# Patient Record
Sex: FEMALE | Race: WHITE | NOT HISPANIC OR LATINO | Employment: FULL TIME | URBAN - METROPOLITAN AREA
[De-identification: names, ages, dates, MRNs, and addresses within clinical notes are randomized per-mention and may not be internally consistent; named-entity substitution may affect disease eponyms.]

---

## 2017-01-25 ENCOUNTER — HOSPITAL ENCOUNTER (EMERGENCY)
Facility: CLINIC | Age: 22
Discharge: HOME | End: 2017-01-25
Attending: EMERGENCY MEDICINE | Admitting: EMERGENCY MEDICINE
Payer: COMMERCIAL

## 2017-01-25 VITALS
TEMPERATURE: 97.8 F | OXYGEN SATURATION: 100 % | DIASTOLIC BLOOD PRESSURE: 86 MMHG | HEIGHT: 64 IN | SYSTOLIC BLOOD PRESSURE: 136 MMHG | BODY MASS INDEX: 19.63 KG/M2 | RESPIRATION RATE: 18 BRPM | WEIGHT: 115 LBS

## 2017-01-25 DIAGNOSIS — S61.213A LACERATION OF LEFT MIDDLE FINGER: ICD-10-CM

## 2017-01-25 PROCEDURE — 12001 RPR S/N/AX/GEN/TRNK 2.5CM/<: CPT

## 2017-01-25 PROCEDURE — 99283 EMERGENCY DEPT VISIT LOW MDM: CPT

## 2017-01-25 RX ORDER — GINSENG 100 MG
CAPSULE ORAL
Status: DISCONTINUED
Start: 2017-01-25 | End: 2017-01-25 | Stop reason: HOSPADM

## 2017-01-25 RX ORDER — BUPIVACAINE HYDROCHLORIDE AND EPINEPHRINE 5; 5 MG/ML; UG/ML
INJECTION, SOLUTION PERINEURAL
Status: DISCONTINUED
Start: 2017-01-25 | End: 2017-01-25 | Stop reason: HOSPADM

## 2017-01-25 ASSESSMENT — ENCOUNTER SYMPTOMS
WOUND: 1
CARDIOVASCULAR NEGATIVE: 1
RESPIRATORY NEGATIVE: 1
GASTROINTESTINAL NEGATIVE: 1
MUSCULOSKELETAL NEGATIVE: 1

## 2017-01-25 NOTE — ED PROVIDER NOTES
Emergency Department Attending Supervision Note  1/25/2017  4:26 PM      I evaluated this patient in conjunction with Carli Smith NP.      Briefly, the patient presented with a laceration. Patient was cutting vegetables, when she slipped and sustained a laceration to her left middle finger. She denies numbness/tingling/weakness anywhere or other injuries.     Physical Exam   Constitutional: The patient is oriented to person, place, and time. Alert and cooperative.   HENT:   Right Ear: External ear normal.   Left Ear: External ear normal.   Nose: Nose normal.    Eyes: Conjunctivae, EOM and lids are normal.   Cardiovascular: RRR, no murmurs, rubs, or gallops  Pulmonary/Chest: Effort normal.   Musculoskeletal: Normal range of motion.  Neurological: Normal strength. No cranial nerve deficit or sensory deficit.   Skin: No rash noted. Laceration to the distal, palmar aspect of the left long finger. No significant bleeding. Distal sensation, vascular, tendon, and motor function intact.   Psychiatric:The patient has a normal mood and affect.       My impression is a laceration. No signs to suggest foreign body or bony injury. Distally sensation, vascular, motor, and tendon function intact. No other injuries. Laceration was repaired. Please refer to separate procedure note for further details. Return instructions given. Removal of sutures in 7 days. She was stable/improved at the time of discharge.         Diagnosis    ICD-10-CM    1. Laceration of left middle finger S61.213A          Lucrecia Meyer MD  01/26/17 1651    Lucrecia Wu MD  01/26/17 1652

## 2017-01-25 NOTE — ED PROVIDER NOTES
History     Chief Complaint:  Laceration      HPI   Yennifer Haines is a 21 year old female who presents with laceration to her left middle finger tip.  At 1500 today, pt was cutting vegetables with a kitchen knife and accidentally cut her finger.  Bleeding was controlled with pressure and a dressing.  Pt denies any numbness or loss of sensation.  Her last tetanus shot was in 2016.  Pt otherwise well, no other complaints.      Allergies:  No Known Allergies     Medications:      ISOtretinoin (ACCUTANE PO)   desogestrel-ethinyl estradiol (APRI) 0.15-30 MG-MCG per tablet   sertraline (ZOLOFT) 100 MG tablet   UNKNOWN TO PATIENT   ibuprofen (IBU) 600 MG tablet       Problem List:    Patient Active Problem List    Diagnosis Date Noted     Adjustment disorder with mixed anxiety and depressed mood 04/16/2015     Heroin abuse      Treatment 2015          Past Medical History:    Past Medical History   Diagnosis Date     Heroin abuse      Depressive disorder        Past Surgical History:    Past Surgical History   Procedure Laterality Date     Orthopedic surgery Bilateral      Bunions     Orthopedic surgery Bilateral childhood     arm surgery 1 on left and 4x right       Family History:    Family History   Problem Relation Age of Onset     GASTROINTESTINAL DISEASE Sister      UC     Breast Cancer Maternal Grandmother      Neurologic Disorder Maternal Grandmother      Erwinna's      DIABETES Maternal Grandfather        Social History:  Marital Status:  Single [1]  Social History   Substance Use Topics     Smoking status: Never Smoker      Smokeless tobacco: Never Used     Alcohol Use: No      Comment: Previously rarely        Review of Systems   Respiratory: Negative.    Cardiovascular: Negative.    Gastrointestinal: Negative.    Musculoskeletal: Negative.    Skin: Positive for wound.        Laceration to left middle finger     Physical Exam   First Vitals:  BP: 136/86 mmHg  Heart Rate: 91  Temp: 97.8  F (36.6  " C)  Resp: 18  Height: 162.6 cm (5' 4\")  Weight: 52.164 kg (115 lb)  SpO2: 100 %    Physical Exam  General:  Alert, no obvious discomfort, well kept.  Eyes:  PERRL, conjunctivae pink, no scleral icterus or conjunctival injection.  ENT:  Moist mucus membranes, normal voice.  Resp:  Lungs clear to auscultation bilaterally, no rales/rhonchi/wheezes. Good air movement to bases, normal respiratory effort.  CV:  Normal rate and rhythm, no murmurs/rubs/gallops.  Skin:  Warm, dry.  No rashes or petechiae.  Laceration to left middle finger tip.  Full ROM of all fingers.   Musculoskeletal: No peripheral edema or calf tenderness, normal gross ROM.   Neuro: Alert and oriented to person/place/time, normal sensation.  Normal sensation of left middle finger.  Responds appropriately to questions and commands.  Normal muscle tone.  Psychiatric: Normal mood and affect, cooperative, good eye contact.    Emergency Department Course   Procedures:     Laceration Repair      LACERATION:  A simple clean 1.5 x 0.5 cm laceration.    LOCATION:  Left middle finger, distal phalanx.    FUNCTION:  Distally sensation, circulation, motor and tendon function are intact.    ANESTHESIA:  Local using 0.5mls total of  0.5% bupivicaine with epi.    PREPARATION:  Irrigation with Normal Saline.    DEBRIDEMENT:  no debridement.    CLOSURE:  Wound was closed with One Layer.  Skin closed with 5 x 4.0 Ethylon using interrupted sutures..         Emergency Department Course:  Nursing notes and vitals reviewed.  I performed an exam of the patient as documented above. GCS 15.   Finger numbed with bupivicaine as noted above.  1620- Dr. Wu in to see pt.    Tech in to clean wound.  1640-laceration repair as noted above.  Pt tolerated well.  Discussed plan of care and discharge with patient and her family.  Keep area clean and dry, ok to apply Bacitracin.  Watch for signs and symptoms of infection including redness, swelling, drainage, fever.  Follow up with PCP " in 7 days for suture removal.  Pt states understanding, her questions were answered.    Tech in to apply bandage and finger splint.     Impression & Plan    Medical Decision Making:  Yennifer Haines is a 21 year old female who presents with laceration to her left middle finger distal phalanx.  Pt was cutting vegetables with kitchen knife when she accidentally cut herself.  Hemostasis achieved with pressure.  On exam, sensation and motor movements intact.  No evidence of tendon injury.  Laceration repaired as noted above.  Pt tolerated well.  Dressing applied.  Discussed proper aftercare with patient and to watch for signs and symptoms of infection.  Pt to follow up with her PCP in 7 days for suture removal.  Return to ER as needed for new or worsening symptoms.      Diagnosis:    ICD-10-CM    1. Laceration of left middle finger S61.213A        Disposition:  discharged to home with family.    Discharge Medications:  New Prescriptions    No medications on file     I, TRINI Rust, interviewed the patient, explained the course of action and discussed the patient with Dr. Wu who then evaluated the patient.      Carli Smith  1/25/2017   [facility] EMERGENCY DEPARTMENT        Carli Smith NP  01/25/17 1801

## 2017-01-25 NOTE — ED NOTES
A&Ox4. ABC's intact. Pt c/o left middle finger laceration. Pt was cutting food at home and cut off the tip of her finger.  Pain 8/10 at this time.  Bleeding controlled with drsg.     Last tetanus 2016

## 2017-01-25 NOTE — ED AVS SNAPSHOT
[facility] Emergency Department    [address]    [facility] [address]    Phone:  [phone]    Fax:  [phone]                                       Yennifer Haines   MRN: [id number]    Department:  [facility] Emergency Department   Date of Visit:  1/25/2017           Patient Information     Date Of Birth          1995        Your diagnoses for this visit were:     Laceration of left middle finger        You were seen by Lucrecia Wu MD.      Follow-up Information     Follow up with Pillo Ray MD In 1 week.    Specialty:  Family Practice    Contact information:    [facility]  [address]  [place] [address]  [phone]          Follow up with [facility] Emergency Department.    Specialty:  EMERGENCY MEDICINE    Why:  As needed, If symptoms worsen    Contact information:    [address]  [facility] [address]  [phone]        Discharge Instructions       Discharge Instructions  Laceration (Cut)    You were seen today for a laceration (cut).  Your doctor examined your laceration for any problems such a buried foreign body (like glass, a splinter, or gravel), or injury to blood vessels, tendons, and nerves.  Your doctor may have also rinsed and/or scrubbed your laceration to help prevent an infection.  Your laceration may have been closed with glue, staples or sutures (stitches).      It may not be possible to find all problems with your laceration on the first visit, and we can't always prevent infections.  Antibiotics are only given when the benefit is more than the risk, and don't prevent all infections. Some lacerations are too high risk to close, and are left open to heal.  All lacerations, no matter how expertly repaired, will cause scarring.    Return to the Emergency Department right away if:    You have more redness, swelling, pain, drainage (pus), a bad smell, or red streaking from your laceration.       You have a fever of 101oF or more.    You have bleeding that you can t stop at home. If your cut starts to bleed, hold pressure on the bleeding area with a clean cloth or put pressure over the bandage.  If the bleeding doesn t stop after using constant pressure for 30 minutes, you should return to the Emergency Department for further treatment.    An area past the laceration is cool, pale, or blue compared with the other side, or has a slower return of color when squeezed.    Your dressing seems too tight or starts to get uncomfortable or painful.    You have loss of normal function or use of an area, such as being unable to straighten or bend a finger normally.    You have a numb area past the laceration.    Return to the Emergency Department or see your regular doctor if:    The laceration starts to come open.     You have something coming out of the cut or a feeling that there is something in the laceration.    Your wound will not heal, or keeps breaking open. There can always be glass, wood, dirt or other things in any wound.  They won t always show up, even on x-rays.  If a wound doesn t heal, this may be why, and it is important to follow-up with your regular doctor.    Home Care:    Take your dressing off in 12 hours, or as instructed by your doctor, to check your laceration. Remove the dressing sooner if it seems too tight or painful, or if it is getting numb, tingly, or pale past the dressing.    Gently wash your laceration 2 times a day with clean cloth and soap.     It is okay to shower, but do not let the laceration soak in water.      If your laceration was closed with wound adhesive or strips: pat it dry and leave it open to the air.     For all other repairs: after you wash your laceration, or at least 2 times a day, apply bacitracin or other antibiotic ointment to the laceration, then cover it with a Band-Aid  or gauze.    Keep the laceration clean. Wear gloves or other protective clothing if  "you are around dirt.    Follow-up:    You need to follow-up with your regular doctor in 7 days.    Your sutures or staples need to be removed in 7 days. Schedule an appointment with your regular doctor to have this done.    Scars:  To help minimize scarring:    Wear sunscreen over the healed laceration when out in the sun.    Massage the area regularly.    You may use Vitamin E oil.    Wait a year.  Most scars will start to fade within a year.    Probiotics: If you have been given an antibiotic, you may want to also take a probiotic pill or eat yogurt with live cultures. Probiotics have \"good bacteria\" to help your intestines stay healthy. Studies have shown that probiotics help prevent diarrhea and other intestine problems (including C. diff infection) when you take antibiotics. You can buy these without a prescription in the pharmacy section of the store.     If you were given a prescription for medicine here today, be sure to read all of the information (including the package insert) that comes with your prescription.  This will include important information about the medicine, its side effects, and any warnings that you need to know about.  The pharmacist who fills the prescription can provide more information and answer questions you may have about the medicine.  If you have questions or concerns that the pharmacist cannot address, please call or return to the Emergency Department.     Remember that you can always come back to the Emergency Department if you are not able to see your regular doctor in the amount of time listed above, if you get any new symptoms, or if there is anything that worries you.        24 Hour Appointment Hotline       To make an appointment at any [facility], call [phone] ([phone]). If you don't have a family doctor or clinic, we will help you find one. [facility] are conveniently located to serve the needs of you and your family.             Review of your " medicines      Our records show that you are taking the medicines listed below. If these are incorrect, please call your family doctor or clinic.        Dose / Directions Last dose taken    ACCUTANE PO        Refills:  0        desogestrel-ethinyl estradiol 0.15-30 MG-MCG per tablet   Commonly known as:  APRI   Dose:  1 tablet        Take 1 tablet by mouth daily   Refills:  0        ibuprofen 600 MG tablet   Commonly known as:  IBU   Dose:  600 mg   Quantity:  60 tablet        Take 1 tablet by mouth every 6 hours as needed for pain.   Refills:  0        sertraline 100 MG tablet   Commonly known as:  ZOLOFT   Dose:  100 mg   Quantity:  7 tablet        Take 1 tablet (100 mg) by mouth daily   Refills:  0        UNKNOWN TO PATIENT   Indication:  Birth Control        Refills:  0                Orders Needing Specimen Collection     None      Pending Results     No orders found from 1/24/2017 to 1/26/2017.            Pending Culture Results     No orders found from 1/24/2017 to 1/26/2017.             Test Results from your hospital stay            Clinical Quality Measure: Blood Pressure Screening     Your blood pressure was checked while you were in the emergency department today. The last reading we obtained was  BP: 136/86 mmHg . Please read the guidelines below about what these numbers mean and what you should do about them.  If your systolic blood pressure (the top number) is less than 120 and your diastolic blood pressure (the bottom number) is less than 80, then your blood pressure is normal. There is nothing more that you need to do about it.  If your systolic blood pressure (the top number) is 120-139 or your diastolic blood pressure (the bottom number) is 80-89, your blood pressure may be higher than it should be. You should have your blood pressure rechecked within a year by a primary care provider.  If your systolic blood pressure (the top number) is 140 or greater or your diastolic blood pressure (the bottom  "number) is 90 or greater, you may have high blood pressure. High blood pressure is treatable, but if left untreated over time it can put you at risk for heart attack, stroke, or kidney failure. You should have your blood pressure rechecked by a primary care provider within the next 4 weeks.  If your provider in the emergency department today gave you specific instructions to follow-up with your doctor or provider even sooner than that, you should follow that instruction and not wait for up to 4 weeks for your follow-up visit.        Thank you for choosing [facility]       Thank you for choosing [facility] for your care. Our goal is always to provide you with excellent care. Hearing back from our patients is one way we can continue to improve our services. Please take a few minutes to complete the written survey that you may receive in the mail after you visit with us. Thank you!        [facility] Information     [facility] lets you send messages to your doctor, view your test results, renew your prescriptions, schedule appointments and more. To sign up, go to [email or web address] . Click on \"Log in\" on the left side of the screen, which will take you to the Welcome page. Then click on \"Sign up Now\" on the right side of the page.     You will be asked to enter the access code listed below, as well as some personal information. Please follow the directions to create your username and password.     Your access code is: [email or web address]  Expires: 2017  6:04 PM     Your access code will  in 90 days. If you need help or a new code, please call your [facility] clinic or [phone].        Care EveryWhere ID     This is your Care EveryWhere ID. This could be used by other organizations to access your [facility] medical records  [id number]        After Visit Summary       This is your record. Keep this with you and show to your community pharmacist(s) and doctor(s) at your next visit.                  "

## 2017-01-25 NOTE — ED AVS SNAPSHOT
[facility] Emergency Department    [address]    [facility] [address]    Phone:  [phone]    Fax:  [phone]                                       Yennifer Haines   MRN: [id number]    Department:  [facility] Emergency Department   Date of Visit:  1/25/2017           After Visit Summary Signature Page     I have received my discharge instructions, and my questions have been answered. I have discussed any challenges I see with this plan with the nurse or doctor.    ..........................................................................................................................................  Patient/Patient Representative Signature      ..........................................................................................................................................  Patient Representative Print Name and Relationship to Patient    ..................................................               ................................................  Date                                            Time    ..........................................................................................................................................  Reviewed by Signature/Title    ...................................................              ..............................................  Date                                                            Time

## 2017-01-26 NOTE — DISCHARGE INSTRUCTIONS
Discharge Instructions  Laceration (Cut)    You were seen today for a laceration (cut).  Your doctor examined your laceration for any problems such a buried foreign body (like glass, a splinter, or gravel), or injury to blood vessels, tendons, and nerves.  Your doctor may have also rinsed and/or scrubbed your laceration to help prevent an infection.  Your laceration may have been closed with glue, staples or sutures (stitches).      It may not be possible to find all problems with your laceration on the first visit, and we can't always prevent infections.  Antibiotics are only given when the benefit is more than the risk, and don't prevent all infections. Some lacerations are too high risk to close, and are left open to heal.  All lacerations, no matter how expertly repaired, will cause scarring.    Return to the Emergency Department right away if:    You have more redness, swelling, pain, drainage (pus), a bad smell, or red streaking from your laceration.      You have a fever of 101oF or more.    You have bleeding that you can t stop at home. If your cut starts to bleed, hold pressure on the bleeding area with a clean cloth or put pressure over the bandage.  If the bleeding doesn t stop after using constant pressure for 30 minutes, you should return to the Emergency Department for further treatment.    An area past the laceration is cool, pale, or blue compared with the other side, or has a slower return of color when squeezed.    Your dressing seems too tight or starts to get uncomfortable or painful.    You have loss of normal function or use of an area, such as being unable to straighten or bend a finger normally.    You have a numb area past the laceration.    Return to the Emergency Department or see your regular doctor if:    The laceration starts to come open.     You have something coming out of the cut or a feeling that there is something in the laceration.    Your wound will not heal, or keeps breaking  "open. There can always be glass, wood, dirt or other things in any wound.  They won t always show up, even on x-rays.  If a wound doesn t heal, this may be why, and it is important to follow-up with your regular doctor.    Home Care:    Take your dressing off in 12 hours, or as instructed by your doctor, to check your laceration. Remove the dressing sooner if it seems too tight or painful, or if it is getting numb, tingly, or pale past the dressing.    Gently wash your laceration 2 times a day with clean cloth and soap.     It is okay to shower, but do not let the laceration soak in water.      If your laceration was closed with wound adhesive or strips: pat it dry and leave it open to the air.     For all other repairs: after you wash your laceration, or at least 2 times a day, apply bacitracin or other antibiotic ointment to the laceration, then cover it with a Band-Aid  or gauze.    Keep the laceration clean. Wear gloves or other protective clothing if you are around dirt.    Follow-up:    You need to follow-up with your regular doctor in 7 days.    Your sutures or staples need to be removed in 7 days. Schedule an appointment with your regular doctor to have this done.    Scars:  To help minimize scarring:    Wear sunscreen over the healed laceration when out in the sun.    Massage the area regularly.    You may use Vitamin E oil.    Wait a year.  Most scars will start to fade within a year.    Probiotics: If you have been given an antibiotic, you may want to also take a probiotic pill or eat yogurt with live cultures. Probiotics have \"good bacteria\" to help your intestines stay healthy. Studies have shown that probiotics help prevent diarrhea and other intestine problems (including C. diff infection) when you take antibiotics. You can buy these without a prescription in the pharmacy section of the store.     If you were given a prescription for medicine here today, be sure to read all of the information " (including the package insert) that comes with your prescription.  This will include important information about the medicine, its side effects, and any warnings that you need to know about.  The pharmacist who fills the prescription can provide more information and answer questions you may have about the medicine.  If you have questions or concerns that the pharmacist cannot address, please call or return to the Emergency Department.     Remember that you can always come back to the Emergency Department if you are not able to see your regular doctor in the amount of time listed above, if you get any new symptoms, or if there is anything that worries you.

## 2017-11-18 ENCOUNTER — HEALTH MAINTENANCE LETTER (OUTPATIENT)
Age: 22
End: 2017-11-18

## 2018-01-31 LAB
HBV SURFACE AG SERPL QL IA: NEGATIVE
HIV 1+2 AB+HIV1 P24 AG SERPL QL IA: NEGATIVE
RUBELLA ABY IGG: NORMAL
T PALLIDUM IGG SER QL: NONREACTIVE

## 2018-09-06 LAB — GROUP B STREP PCR: NEGATIVE

## 2018-09-13 ENCOUNTER — HOSPITAL ENCOUNTER (INPATIENT)
Facility: CLINIC | Age: 23
LOS: 3 days | Discharge: HOME | End: 2018-09-16
Attending: OBSTETRICS & GYNECOLOGY | Admitting: OBSTETRICS & GYNECOLOGY
Payer: COMMERCIAL

## 2018-09-13 LAB
ABO + RH BLD: NORMAL
ABO + RH BLD: NORMAL
BLD GP AB SCN SERPL QL: NORMAL
BLOOD BANK CMNT PATIENT-IMP: NORMAL
HGB BLD-MCNC: 12.1 G/DL (ref 11.7–15.7)
SPECIMEN EXP DATE BLD: NORMAL

## 2018-09-13 PROCEDURE — 12000031 ZZH R&B OB CRITICAL

## 2018-09-13 PROCEDURE — 86900 BLOOD TYPING SEROLOGIC ABO: CPT | Performed by: OBSTETRICS & GYNECOLOGY

## 2018-09-13 PROCEDURE — 86901 BLOOD TYPING SEROLOGIC RH(D): CPT | Performed by: OBSTETRICS & GYNECOLOGY

## 2018-09-13 PROCEDURE — 85018 HEMOGLOBIN: CPT | Performed by: OBSTETRICS & GYNECOLOGY

## 2018-09-13 PROCEDURE — 86780 TREPONEMA PALLIDUM: CPT | Performed by: OBSTETRICS & GYNECOLOGY

## 2018-09-13 PROCEDURE — 86850 RBC ANTIBODY SCREEN: CPT | Performed by: OBSTETRICS & GYNECOLOGY

## 2018-09-13 PROCEDURE — 25000132 ZZH RX MED GY IP 250 OP 250 PS 637: Performed by: OBSTETRICS & GYNECOLOGY

## 2018-09-13 PROCEDURE — 3E0P7VZ INTRODUCTION OF HORMONE INTO FEMALE REPRODUCTIVE, VIA NATURAL OR ARTIFICIAL OPENING: ICD-10-PCS | Performed by: OBSTETRICS & GYNECOLOGY

## 2018-09-13 RX ORDER — SODIUM CHLORIDE, SODIUM LACTATE, POTASSIUM CHLORIDE, CALCIUM CHLORIDE 600; 310; 30; 20 MG/100ML; MG/100ML; MG/100ML; MG/100ML
INJECTION, SOLUTION INTRAVENOUS CONTINUOUS
Status: DISCONTINUED | OUTPATIENT
Start: 2018-09-13 | End: 2018-09-14

## 2018-09-13 RX ORDER — OXYTOCIN/0.9 % SODIUM CHLORIDE 30/500 ML
100-340 PLASTIC BAG, INJECTION (ML) INTRAVENOUS CONTINUOUS PRN
Status: COMPLETED | OUTPATIENT
Start: 2018-09-13 | End: 2018-09-14

## 2018-09-13 RX ORDER — ZOLPIDEM TARTRATE 5 MG/1
5 TABLET ORAL
Status: DISCONTINUED | OUTPATIENT
Start: 2018-09-13 | End: 2018-09-14

## 2018-09-13 RX ORDER — METHYLERGONOVINE MALEATE 0.2 MG/ML
200 INJECTION INTRAVENOUS
Status: DISCONTINUED | OUTPATIENT
Start: 2018-09-13 | End: 2018-09-14

## 2018-09-13 RX ORDER — ACETAMINOPHEN 325 MG/1
650 TABLET ORAL EVERY 4 HOURS PRN
Status: DISCONTINUED | OUTPATIENT
Start: 2018-09-13 | End: 2018-09-14

## 2018-09-13 RX ORDER — CARBOPROST TROMETHAMINE 250 UG/ML
250 INJECTION, SOLUTION INTRAMUSCULAR
Status: DISCONTINUED | OUTPATIENT
Start: 2018-09-13 | End: 2018-09-14

## 2018-09-13 RX ORDER — PRENATAL VIT/IRON FUM/FOLIC AC 27MG-0.8MG
1 TABLET ORAL DAILY
COMMUNITY

## 2018-09-13 RX ORDER — NALOXONE HYDROCHLORIDE 0.4 MG/ML
.1-.4 INJECTION, SOLUTION INTRAMUSCULAR; INTRAVENOUS; SUBCUTANEOUS
Status: DISCONTINUED | OUTPATIENT
Start: 2018-09-13 | End: 2018-09-14

## 2018-09-13 RX ORDER — OXYTOCIN 10 [USP'U]/ML
10 INJECTION, SOLUTION INTRAMUSCULAR; INTRAVENOUS
Status: DISCONTINUED | OUTPATIENT
Start: 2018-09-13 | End: 2018-09-14

## 2018-09-13 RX ORDER — IBUPROFEN 800 MG/1
800 TABLET, FILM COATED ORAL
Status: DISCONTINUED | OUTPATIENT
Start: 2018-09-13 | End: 2018-09-14

## 2018-09-13 RX ORDER — FENTANYL CITRATE 50 UG/ML
50-100 INJECTION, SOLUTION INTRAMUSCULAR; INTRAVENOUS
Status: DISCONTINUED | OUTPATIENT
Start: 2018-09-13 | End: 2018-09-14

## 2018-09-13 RX ORDER — OXYCODONE AND ACETAMINOPHEN 5; 325 MG/1; MG/1
1 TABLET ORAL
Status: DISCONTINUED | OUTPATIENT
Start: 2018-09-13 | End: 2018-09-14

## 2018-09-13 RX ORDER — ONDANSETRON 2 MG/ML
4 INJECTION INTRAMUSCULAR; INTRAVENOUS EVERY 6 HOURS PRN
Status: DISCONTINUED | OUTPATIENT
Start: 2018-09-13 | End: 2018-09-14

## 2018-09-13 RX ADMIN — ZOLPIDEM TARTRATE 5 MG: 5 TABLET, COATED ORAL at 23:05

## 2018-09-13 RX ADMIN — DINOPROSTONE 10 MG: 10 INSERT VAGINAL at 21:30

## 2018-09-13 NOTE — IP AVS SNAPSHOT
MRN:[id number]                      After Visit Summary   9/13/2018    Yennifer Haines    MRN: [id number]           Thank you!     Thank you for choosing [facility] for your care. Our goal is always to provide you with excellent care. Hearing back from our patients is one way we can continue to improve our services. Please take a few minutes to complete the written survey that you may receive in the mail after you visit. If you would like to speak to someone directly about your visit please contact Patient Relations at [phone]. Thank you!          Patient Information     Date Of Birth          1995        Designated Caregiver       Most Recent Value    Caregiver    Will someone help with your care after discharge? no      About your hospital stay     You were admitted on:  September 13, 2018 You last received care in the:  [facility] Postpartum    You were discharged on:  September 16, 2018        Reason for your hospital stay       Maternity care                  Who to Call     For medical emergencies, please call 911.  For non-urgent questions about your medical care, please call your primary care provider or clinic, [phone]          Attending Provider     Provider Specialty    Dian Amin MD OB/Gyn    Gregoria Carlos MD OB/Gyn    Kim, Justin Zapata MD OB/Gyn       Primary Care Provider Office Phone # Fax #    Dian Amin [phone]      After Care Instructions     Activity       Review discharge instructions            Diet       Resume previous diet            Discharge Instructions - Postpartum visit       Schedule postpartum visit with your provider and return to clinic in 6 weeks.                  Further instructions from your care team       Postpartum Vaginal Delivery Instructions  Lactation [phone]  [facility] [phone]    Activity       Ask family and friends for help  when you need it.    Do not place anything in your vagina for 6 weeks.    You are not restricted on other activities, but take it easy for a few weeks to allow your body to recover from delivery.  You are able to do any activities you feel up to that point.    No driving until you have stopped taking your pain medications (usually two weeks after delivery).     Call your health care provider if you have any of these symptoms:       Increased pain, swelling, redness, or fluid around your stiches from an episiotomy or perineal tear.    A fever above 100.4 F (38 C) with or without chills when placing a thermometer under your tongue.    You soak a sanitary pad with blood within 1 hour, or you see blood clots larger than a golf ball.    Bleeding that lasts more than 6 weeks.    Vaginal discharge that smells bad.    Severe pain, cramping or tenderness in your lower belly area.    A need to urinate more frequently (use the toilet more often), more urgently (use the toilet very quickly), or it burns when you urinate.    Nausea and vomiting.    Redness, swelling or pain around a vein in your leg.    Problems breastfeeding or a red or painful area on your breast.    Chest pain and cough or are gasping for air.    Problems coping with sadness, anxiety, or depression.  If you have any concerns about hurting yourself or the baby, call your provider immediately.     You have questions or concerns after you return home.     Keep your hands clean:  Always wash your hands before touching your perineal area and stitches.  This helps reduce your risk of infection.  If your hands aren't dirty, you may use an alcohol hand-rub to clean your hands. Keep your nails clean and short.        Pending Results     No orders found from 9/11/2018 to 9/14/2018.            Statement of Approval     Ordered          09/16/18 0952  I have reviewed and agree with all the recommendations and orders detailed in this document.  EFFECTIVE NOW     Approved  "and electronically signed by:  Gregoria Carlos MD             Admission Information     Date & Time Provider Department Dept. Phone    2018 Justin Alvarez MD [facility] [phone]      Your Vitals Were     Blood Pressure Pulse Temperature Respirations Height Weight    130/74 83 98.3  F (36.8  C) (Oral) 16 1.626 m (5' 4\") 59 kg (130 lb)    BMI (Body Mass Index)                   22.31 kg/m2           MyChart Information     [facility] lets you send messages to your doctor, view your test results, renew your prescriptions, schedule appointments and more. To sign up, go to [email or web address] . Click on \"Log in\" on the left side of the screen, which will take you to the Welcome page. Then click on \"Sign up Now\" on the right side of the page.     You will be asked to enter the access code listed below, as well as some personal information. Please follow the directions to create your username and password.     Your access code is: [email or web address]  Expires: 12/15/2018 10:05 AM     Your access code will  in 90 days. If you need help or a new code, please call your [facility] clinic or [phone].        Care EveryWhere ID     This is your Care EveryWhere ID. This could be used by other organizations to access your [facility] medical records  [id number]        Equal Access to Services     [facility]: Hadii jason colemano Sojanine, waaxda luqadaha, qaybta kaalmada declanyadavis, den fiore . So [facility] [phone].    ATENCIÓN: Si habla español, tiene a mo disposición servicios gratuitos de asistencia lingüística. Lljavan al [phone].    We comply with applicable federal civil rights laws and [place] laws. We do not discriminate on the basis of race, color, national origin, age, disability, sex, sexual orientation, or gender identity.               Review of your medicines      CONTINUE these medicines which have NOT CHANGED        Dose / " Directions    ibuprofen 600 MG tablet   Commonly known as:  IBU   Used for:  Rib pain on right side        Dose:  600 mg   Take 1 tablet by mouth every 6 hours as needed for pain.   Quantity:  60 tablet   Refills:  0       prenatal multivitamin plus iron 27-0.8 MG Tabs per tablet        Dose:  1 tablet   Take 1 tablet by mouth daily   Refills:  0       sertraline 100 MG tablet   Commonly known as:  ZOLOFT   Used for:  Adjustment disorder with mixed anxiety and depressed mood        Dose:  100 mg   Take 1 tablet (100 mg) by mouth daily   Quantity:  7 tablet   Refills:  0         STOP taking     ACCUTANE PO           desogestrel-ethinyl estradiol 0.15-30 MG-MCG per tablet   Commonly known as:  APRI           UNKNOWN TO PATIENT                    Protect others around you: Learn how to safely use, store and throw away your medicines at www.disposemymeds.org.             Medication List: This is a list of all your medications and when to take them. Check marks below indicate your daily home schedule. Keep this list as a reference.      Medications           Morning Afternoon Evening Bedtime As Needed    ibuprofen 600 MG tablet   Commonly known as:  IBU   Take 1 tablet by mouth every 6 hours as needed for pain.   Last time this was given:  800 mg on 9/16/2018  7:36 AM                                prenatal multivitamin plus iron 27-0.8 MG Tabs per tablet   Take 1 tablet by mouth daily                                sertraline 100 MG tablet   Commonly known as:  ZOLOFT   Take 1 tablet (100 mg) by mouth daily

## 2018-09-13 NOTE — IP AVS SNAPSHOT
[facility]    [address]    [facility] [address]    Phone:  [phone]    Fax:  [phone]                                       After Visit Summary   9/13/2018    Yennifer Haines    MRN: [id number]           After Visit Summary Signature Page     I have received my discharge instructions, and my questions have been answered. I have discussed any challenges I see with this plan with the nurse or doctor.    ..........................................................................................................................................  Patient/Patient Representative Signature      ..........................................................................................................................................  Patient Representative Print Name and Relationship to Patient    ..................................................               ................................................  Date                                   Time    ..........................................................................................................................................  Reviewed by Signature/Title    ...................................................              ..............................................  Date                                               Time          22EPIC Rev 08/18

## 2018-09-14 ENCOUNTER — ANESTHESIA EVENT (OUTPATIENT)
Dept: OBGYN | Facility: CLINIC | Age: 23
End: 2018-09-14
Payer: COMMERCIAL

## 2018-09-14 ENCOUNTER — ANESTHESIA (OUTPATIENT)
Dept: OBGYN | Facility: CLINIC | Age: 23
End: 2018-09-14
Payer: COMMERCIAL

## 2018-09-14 LAB
AMPHETAMINES UR QL SCN: NEGATIVE
CANNABINOIDS UR QL: NEGATIVE
COCAINE UR QL: NEGATIVE
OPIATES UR QL SCN: NEGATIVE
PCP UR QL SCN: NEGATIVE
T PALLIDUM AB SER QL: NONREACTIVE

## 2018-09-14 PROCEDURE — 12000031 ZZH R&B OB CRITICAL

## 2018-09-14 PROCEDURE — 40000671 ZZH STATISTIC ANESTHESIA CASE

## 2018-09-14 PROCEDURE — 25000125 ZZHC RX 250

## 2018-09-14 PROCEDURE — 25000128 H RX IP 250 OP 636

## 2018-09-14 PROCEDURE — 25000128 H RX IP 250 OP 636: Performed by: OBSTETRICS & GYNECOLOGY

## 2018-09-14 PROCEDURE — 25000125 ZZHC RX 250: Performed by: OBSTETRICS & GYNECOLOGY

## 2018-09-14 PROCEDURE — 37000011 ZZH ANESTHESIA WARD SERVICE

## 2018-09-14 PROCEDURE — 0KQM0ZZ REPAIR PERINEUM MUSCLE, OPEN APPROACH: ICD-10-PCS | Performed by: OBSTETRICS & GYNECOLOGY

## 2018-09-14 PROCEDURE — 3E0R3BZ INTRODUCTION OF ANESTHETIC AGENT INTO SPINAL CANAL, PERCUTANEOUS APPROACH: ICD-10-PCS | Performed by: ANESTHESIOLOGY

## 2018-09-14 PROCEDURE — 00HU33Z INSERTION OF INFUSION DEVICE INTO SPINAL CANAL, PERCUTANEOUS APPROACH: ICD-10-PCS | Performed by: ANESTHESIOLOGY

## 2018-09-14 PROCEDURE — 0UQMXZZ REPAIR VULVA, EXTERNAL APPROACH: ICD-10-PCS | Performed by: OBSTETRICS & GYNECOLOGY

## 2018-09-14 PROCEDURE — 27110038 ZZH RX 271

## 2018-09-14 PROCEDURE — 72200001 ZZH LABOR CARE VAGINAL DELIVERY SINGLE

## 2018-09-14 PROCEDURE — 80307 DRUG TEST PRSMV CHEM ANLYZR: CPT | Performed by: OBSTETRICS & GYNECOLOGY

## 2018-09-14 PROCEDURE — 25000132 ZZH RX MED GY IP 250 OP 250 PS 637: Performed by: OBSTETRICS & GYNECOLOGY

## 2018-09-14 PROCEDURE — 10907ZC DRAINAGE OF AMNIOTIC FLUID, THERAPEUTIC FROM PRODUCTS OF CONCEPTION, VIA NATURAL OR ARTIFICIAL OPENING: ICD-10-PCS | Performed by: OBSTETRICS & GYNECOLOGY

## 2018-09-14 RX ORDER — ONDANSETRON 4 MG/1
4 TABLET, ORALLY DISINTEGRATING ORAL EVERY 6 HOURS PRN
Status: DISCONTINUED | OUTPATIENT
Start: 2018-09-14 | End: 2018-09-14

## 2018-09-14 RX ORDER — HYDROCORTISONE 2.5 %
CREAM (GRAM) TOPICAL 3 TIMES DAILY PRN
Status: DISCONTINUED | OUTPATIENT
Start: 2018-09-14 | End: 2018-09-16 | Stop reason: HOSPADM

## 2018-09-14 RX ORDER — NALBUPHINE HYDROCHLORIDE 10 MG/ML
2.5-5 INJECTION, SOLUTION INTRAMUSCULAR; INTRAVENOUS; SUBCUTANEOUS EVERY 6 HOURS PRN
Status: DISCONTINUED | OUTPATIENT
Start: 2018-09-14 | End: 2018-09-14

## 2018-09-14 RX ORDER — OXYTOCIN/0.9 % SODIUM CHLORIDE 30/500 ML
340 PLASTIC BAG, INJECTION (ML) INTRAVENOUS CONTINUOUS PRN
Status: DISCONTINUED | OUTPATIENT
Start: 2018-09-14 | End: 2018-09-16 | Stop reason: HOSPADM

## 2018-09-14 RX ORDER — BUPIVACAINE HCL/0.9 % NACL/PF 0.125 %
PLASTIC BAG, INJECTION (ML) EPIDURAL
Status: COMPLETED
Start: 2018-09-14 | End: 2018-09-14

## 2018-09-14 RX ORDER — BUPIVACAINE HCL/0.9 % NACL/PF 0.125 %
PLASTIC BAG, INJECTION (ML) EPIDURAL CONTINUOUS
Status: DISCONTINUED | OUTPATIENT
Start: 2018-09-14 | End: 2018-09-14

## 2018-09-14 RX ORDER — NALOXONE HYDROCHLORIDE 0.4 MG/ML
.1-.4 INJECTION, SOLUTION INTRAMUSCULAR; INTRAVENOUS; SUBCUTANEOUS
Status: DISCONTINUED | OUTPATIENT
Start: 2018-09-14 | End: 2018-09-14

## 2018-09-14 RX ORDER — LIDOCAINE 40 MG/G
CREAM TOPICAL
Status: DISCONTINUED | OUTPATIENT
Start: 2018-09-14 | End: 2018-09-14

## 2018-09-14 RX ORDER — OXYTOCIN 10 [USP'U]/ML
10 INJECTION, SOLUTION INTRAMUSCULAR; INTRAVENOUS
Status: DISCONTINUED | OUTPATIENT
Start: 2018-09-14 | End: 2018-09-16 | Stop reason: HOSPADM

## 2018-09-14 RX ORDER — OXYTOCIN/0.9 % SODIUM CHLORIDE 30/500 ML
1-24 PLASTIC BAG, INJECTION (ML) INTRAVENOUS CONTINUOUS
Status: DISCONTINUED | OUTPATIENT
Start: 2018-09-14 | End: 2018-09-14

## 2018-09-14 RX ORDER — EPHEDRINE SULFATE 50 MG/ML
INJECTION, SOLUTION INTRAMUSCULAR; INTRAVENOUS; SUBCUTANEOUS
Status: DISCONTINUED
Start: 2018-09-14 | End: 2018-09-14 | Stop reason: HOSPADM

## 2018-09-14 RX ORDER — IBUPROFEN 800 MG/1
800 TABLET, FILM COATED ORAL EVERY 6 HOURS PRN
Status: DISCONTINUED | OUTPATIENT
Start: 2018-09-14 | End: 2018-09-16 | Stop reason: HOSPADM

## 2018-09-14 RX ORDER — AMOXICILLIN 250 MG
1 CAPSULE ORAL 2 TIMES DAILY
Status: DISCONTINUED | OUTPATIENT
Start: 2018-09-14 | End: 2018-09-16 | Stop reason: HOSPADM

## 2018-09-14 RX ORDER — BISACODYL 10 MG
10 SUPPOSITORY, RECTAL RECTAL DAILY PRN
Status: DISCONTINUED | OUTPATIENT
Start: 2018-09-16 | End: 2018-09-16 | Stop reason: HOSPADM

## 2018-09-14 RX ORDER — LANOLIN 100 %
OINTMENT (GRAM) TOPICAL
Status: DISCONTINUED | OUTPATIENT
Start: 2018-09-14 | End: 2018-09-16 | Stop reason: HOSPADM

## 2018-09-14 RX ORDER — EPHEDRINE SULFATE 50 MG/ML
5 INJECTION, SOLUTION INTRAMUSCULAR; INTRAVENOUS; SUBCUTANEOUS
Status: DISCONTINUED | OUTPATIENT
Start: 2018-09-14 | End: 2018-09-14

## 2018-09-14 RX ORDER — OXYTOCIN/0.9 % SODIUM CHLORIDE 30/500 ML
100 PLASTIC BAG, INJECTION (ML) INTRAVENOUS CONTINUOUS
Status: DISCONTINUED | OUTPATIENT
Start: 2018-09-14 | End: 2018-09-16 | Stop reason: HOSPADM

## 2018-09-14 RX ORDER — ACETAMINOPHEN 325 MG/1
650 TABLET ORAL EVERY 4 HOURS PRN
Status: DISCONTINUED | OUTPATIENT
Start: 2018-09-14 | End: 2018-09-16 | Stop reason: HOSPADM

## 2018-09-14 RX ORDER — AMOXICILLIN 250 MG
2 CAPSULE ORAL 2 TIMES DAILY
Status: DISCONTINUED | OUTPATIENT
Start: 2018-09-14 | End: 2018-09-16 | Stop reason: HOSPADM

## 2018-09-14 RX ORDER — NALOXONE HYDROCHLORIDE 0.4 MG/ML
.1-.4 INJECTION, SOLUTION INTRAMUSCULAR; INTRAVENOUS; SUBCUTANEOUS
Status: DISCONTINUED | OUTPATIENT
Start: 2018-09-14 | End: 2018-09-16 | Stop reason: HOSPADM

## 2018-09-14 RX ORDER — ONDANSETRON 2 MG/ML
4 INJECTION INTRAMUSCULAR; INTRAVENOUS EVERY 6 HOURS PRN
Status: DISCONTINUED | OUTPATIENT
Start: 2018-09-14 | End: 2018-09-14

## 2018-09-14 RX ADMIN — FENTANYL CITRATE 100 MCG: 50 INJECTION INTRAMUSCULAR; INTRAVENOUS at 13:36

## 2018-09-14 RX ADMIN — OXYTOCIN-SODIUM CHLORIDE 0.9% IV SOLN 30 UNIT/500ML 1 MILLI-UNITS/MIN: 30-0.9/5 SOLUTION at 10:26

## 2018-09-14 RX ADMIN — Medication 15 ML/HR: at 18:11

## 2018-09-14 RX ADMIN — FENTANYL CITRATE 100 MCG: 50 INJECTION INTRAMUSCULAR; INTRAVENOUS at 14:38

## 2018-09-14 RX ADMIN — SODIUM CHLORIDE, POTASSIUM CHLORIDE, SODIUM LACTATE AND CALCIUM CHLORIDE: 600; 310; 30; 20 INJECTION, SOLUTION INTRAVENOUS at 20:50

## 2018-09-14 RX ADMIN — FENTANYL CITRATE 100 MCG: 50 INJECTION INTRAMUSCULAR; INTRAVENOUS at 16:37

## 2018-09-14 RX ADMIN — OXYTOCIN-SODIUM CHLORIDE 0.9% IV SOLN 30 UNIT/500ML 340 ML/HR: 30-0.9/5 SOLUTION at 21:42

## 2018-09-14 RX ADMIN — FENTANYL CITRATE 50 MCG: 50 INJECTION INTRAMUSCULAR; INTRAVENOUS at 06:06

## 2018-09-14 RX ADMIN — IBUPROFEN 800 MG: 800 TABLET ORAL at 22:44

## 2018-09-14 RX ADMIN — SODIUM CHLORIDE, POTASSIUM CHLORIDE, SODIUM LACTATE AND CALCIUM CHLORIDE 1000 ML: 600; 310; 30; 20 INJECTION, SOLUTION INTRAVENOUS at 18:10

## 2018-09-14 RX ADMIN — FENTANYL CITRATE 50 MCG: 50 INJECTION INTRAMUSCULAR; INTRAVENOUS at 03:46

## 2018-09-14 RX ADMIN — SODIUM CHLORIDE, POTASSIUM CHLORIDE, SODIUM LACTATE AND CALCIUM CHLORIDE 500 ML: 600; 310; 30; 20 INJECTION, SOLUTION INTRAVENOUS at 03:52

## 2018-09-14 NOTE — PROVIDER NOTIFICATION
09/14/18 1326   Provider Notification   Provider Name/Title Huepfel   Method of Notification At Bedside   Notification Reason Status Update;SVE   strip reviewed, AROM clear fluid

## 2018-09-14 NOTE — ANESTHESIA PREPROCEDURE EVALUATION
PAC NOTE:       ANESTHESIA PRE EVALUATION:  Anesthesia Evaluation       history and physical reviewed .      No history of anesthetic complications          ROS/MED HX    ENT/Pulmonary:  - neg pulmonary ROS     Neurologic:  - neg neurologic ROS     Cardiovascular:  - neg cardiovascular ROS       METS/Exercise Tolerance:     Hematologic:         Musculoskeletal:         GI/Hepatic:     (+) GERD       Renal/Genitourinary:         Endo:         Psychiatric:         Infectious Disease:         Malignancy:         Other:                     Physical Exam  Normal systems: cardiovascular and pulmonary    Airway   Mallampati: II    Dental     Cardiovascular       Pulmonary     Other findings: Lab Test        09/13/18 03/20/13                       2150          1114          WBC           --          7.0           HGB          12.1         13.3          MCV           --          95            PLT           --          275            Lab Test        03/20/13                       1114          NA           142           POTASSIUM    3.8           CHLORIDE     104           CO2          25            BUN          9             CR           0.63          ANIONGAP     12            QUANG          9.2           GLC          71              neg OB ROS            Anesthesia Plan      History & Physical Review      ASA Status:  .  OB Epidural Asa: 2            Postoperative Care      Consents  Anesthetic plan, risks, benefits and alternatives discussed with:  Patient..                            .

## 2018-09-14 NOTE — PLAN OF CARE
Patient arrived to L and D unit at 1955 for medical induction for IUGR.  Denies leakage of fluid, vaginal bleeding, headache, epigastric pain, visual disturbances.  Fetal movement present.  External monitors applied.  Physical assessment and health history taken.  Admission questions answered and bill of rights reviewed.  PLAN:  Orders for IOL from Dr. Amin.

## 2018-09-14 NOTE — PROGRESS NOTES
[facility]  Labor Progress Note    S: Patient is doing ok, resting. Feeling some cramping. Discussed AROM, she is agreeable.    O:   Patient Vitals for the past 4 hrs:   BP Temp Temp src Pulse Resp   18 1228 136/80 98.6  F (37  C) Oral 83 18   18 1030 123/78 - - - -     SVE: 1.5/80/-1, AROM with clear fluid    FHT: Baseline 130, moderate variability, + accelerations, no decelerations  [place]: q1-2 minutes, irritaibility    A/P: 23 year old  at 38w1d here for IOL for suspected IUGR.    Labor: - s/p cervidil. On pitocin. Now s/p AROM. Will place IUPC if needed.  FWB: - Category I FHT  GBS:  - negative, Penicillin not indicated    Anticipate     MD Carlos A Aguayo Ob/Gyn  2018, 1:32 PM

## 2018-09-14 NOTE — H&P
"OB Brief Admit H&P    Pt is a 23 year old  @ 38w0d by [address] [place] [address] U who presented to L&D with for induction of labor secondary to IUGR diagnosed on growth US today.    Patient's prenatal course has been complicated by:   1. Anxiety & Depression - off meds in early pregnancy  2. IUGR - US  showed EFW 5lb3oz (<10%) with AC < 2.3%. Normal BPP and UAR. Growth US at 34wks WNL (43% with AC in 49%)  3. Elevated 1hr, normal 3hr GTT      Prenatal Labs:    Blood type O+  Rubella immune  [id number], 3hr = 76/152/119/91  GBS negative      EFW: 5lb3oz    /67  Pulse 83  Temp 98.1  F (36.7  C) (Oral)  Resp 18  Ht 1.626 m (5' 4\")  Wt 59 kg (130 lb)  BMI 22.31 kg/m2  EFM:  Baseline 135, moderate variaibilty, + accels, no decels, Cat I  [place]: occasional   SVE: FT/60/-2  Membranes:  Intact    Assessment:  23 year old  @ 38w0d admitted for IOL secondary to IUGR    Plan:  1. Admit to labor and delivery   2. IOL: Plan cervidil overnight  3. S/p TDAP this pregnancy    Dian Amin  2018  9:22 PM      "

## 2018-09-14 NOTE — PROVIDER NOTIFICATION
09/14/18 0826   Provider Notification   Provider Name/Title swigert   Method of Notification Phone   Request Evaluate in Person   Notification Reason Status Update   updated, coming to evaluate

## 2018-09-14 NOTE — PROVIDER NOTIFICATION
09/14/18 0714   Provider Notification   Provider Name/Title Dr. Amin   Method of Notification In Department   Request Evaluate - Remote   Notification Reason Decels;Uterine Activity;Pain   Updated MD of periods of tachysystole and several episodes of recurrent variable and late decelerations which resolved with interventions, pt has had fentanyl x 2, cervix was checked x 1 and was FT/80/-2, current FHR tracing Category 1.  MD viewing current tracing.  No new orders received.

## 2018-09-14 NOTE — ANESTHESIA PROCEDURE NOTES
Peripheral nerve/Neuraxial procedure note : epidural catheter  Pre-Procedure  Performed by SANTI FLORES  Referred by JOSIAS  Location: OB    Procedure Times:9/14/2018 5:55 PM and 9/14/2018 6:08 PM  Pre-Anesthestic Checklist: patient identified, IV checked, site marked, risks and benefits discussed, informed consent, monitors and equipment checked, pre-op evaluation and at physician/surgeon's request    Timeout  Correct Patient: Yes   Correct Procedure: Yes   Correct Site: Yes   Correct Laterality: Yes and N/A   Correct Position: Yes   Site Marked: Yes, N/A   .   Procedure Documentation    .    Procedure:    Epidural catheter.     .  .       Assessment/Narrative  .  .  . Comments:  Pre-Procedure  Performed by Santi Flores MD  Location: OB.      PreAnesthestic Checklist: patient identified, IV checked, risks and benefits discussed, informed consent obtained, monitors and equipment checked, pre-op evaluation and at physician/surgeon's request.    Timeout   Correct Patient: Yes  Correct Procedure: Epidural catheter placement  Correct Site: Yes   Correct Position: Yes    Procedure Documentation  Procedure:   Epidural catheter block for Labor    Patient currently in labor and she and OBMD request a labor epidural to control her labor pains. Patient was interviewed and examined. Procedure and risks including but not limited to bleeding, infection, nerve injury, paralysis, PDPH, and inadequate block requiring intervention discussed with patient. Questions answered. This epidural is to be placed in anticipation of vaginal delivery.  She consents to the epidural procedure.  Time-out was performed.  I or my partners remain immediately available for management of any issues or complications and will monitor at appropriate intervals.  Procedure: Patient sitting. Betadine prep x 3. Sterile drape applied.  Mask and gloves used.  Lidocaine 1%  local infiltration at L 3-4.  17 G. Tuohy needle at L3-4 by loss of resistance  into epidural space.  No CSF, paresthesia or blood. 1.5 % Lidocaine with 1:200,000 Epinephrine 5cc test dose. Epidural catheter inserted w/o resistance to 5 cm in epidural space. Then 0.25% bupivicaine 10 cc with NS 5 cc.  Aspiration negative for blood and CSF.   Negative for neuro change, paresthesia or symptoms of intravascular injection or intrathecal injection.  Infusion orders written and infusion of 0.125% bupivicaine 15cc per hour started.    Santi Bhatt MD

## 2018-09-14 NOTE — PROGRESS NOTES
[facility] Labor and Delivery Progress Note    Yennifer Haines MRN# [id number]   Age: 23 year old YOB: 1995           Subjective:     Cxts frequent and painful; no LOF; cervidil in place; x1 spontaneous decel > 1 hr ago and FHTs now reactive and cat 1          Objective:   Patient Vitals for the past 8 hrs:   BP Temp Temp src Resp   09/14/18 0728 - 97.9  F (36.6  C) Oral 18   09/14/18 0342 121/70 98.6  F (37  C) Oral 18        Cervical Exam: 1/70%/-2; cervidil removed ( )    Membranes: Intact     Fetal Heart Rate:    Monitor: External US    Variability: Moderate    Baseline Rate: 150 bpm    Fetal Heart Rate Tracing: cat 1          Assessment:   1)  IUP at  38w1d   2)  Induction for suspected IUGR        Plan:   Pitocin augmentation  AROM when clinically indicated  epidural      Gregoria Mony Carlos MD

## 2018-09-14 NOTE — PROVIDER NOTIFICATION
09/13/18 2037   Provider Notification   Provider Name/Title Dr. Amin   Method of Notification Phone   Request Evaluate - Remote   Notification Reason Status Update   MD updated on patient arrival, FHT's, and contraction pattern. Orders received for cervidil placement. Orders also received for ambian 5 mg prn for sleep.  Epidural, nitrous oxide, or fentanyl for pain overnight per MD.

## 2018-09-14 NOTE — PLAN OF CARE
At 0330, pt c/o cramping pain 6/10 unrelieved with hot packs and requested pain medication.  SVE = 0.5/80/-2, cervidil remaining in place.  Fentanyl given with relief.  At 0352, uterine tachysystole noted.  IV fluid bolus initiated.  Continuing to monitor.

## 2018-09-14 NOTE — PROVIDER NOTIFICATION
09/14/18 1701   Provider Notification   Provider Name/Title Huepfel   Method of Notification Phone   Notification Reason Status Update   Jamacarena taking over

## 2018-09-14 NOTE — PROVIDER NOTIFICATION
09/14/18 0900   Provider Notification   Provider Name/Title swigert   Method of Notification At Bedside   Request Evaluate in Person   Notification Reason Status Update;SVE   cervidil removed start pitocin in an hour prn

## 2018-09-15 LAB — HGB BLD-MCNC: 11.2 G/DL (ref 11.7–15.7)

## 2018-09-15 PROCEDURE — 40000083 ZZH STATISTIC IP LACTATION SERVICES 1-15 MIN

## 2018-09-15 PROCEDURE — 36415 COLL VENOUS BLD VENIPUNCTURE: CPT | Performed by: OBSTETRICS & GYNECOLOGY

## 2018-09-15 PROCEDURE — 85018 HEMOGLOBIN: CPT | Performed by: OBSTETRICS & GYNECOLOGY

## 2018-09-15 PROCEDURE — 25000132 ZZH RX MED GY IP 250 OP 250 PS 637: Performed by: OBSTETRICS & GYNECOLOGY

## 2018-09-15 PROCEDURE — 12000027 ZZH R&B OB

## 2018-09-15 RX ADMIN — SENNOSIDES AND DOCUSATE SODIUM 1 TABLET: 8.6; 5 TABLET ORAL at 08:03

## 2018-09-15 RX ADMIN — SENNOSIDES AND DOCUSATE SODIUM 1 TABLET: 8.6; 5 TABLET ORAL at 22:35

## 2018-09-15 RX ADMIN — IBUPROFEN 800 MG: 800 TABLET ORAL at 08:03

## 2018-09-15 RX ADMIN — IBUPROFEN 800 MG: 800 TABLET ORAL at 22:35

## 2018-09-15 RX ADMIN — IBUPROFEN 800 MG: 800 TABLET ORAL at 15:33

## 2018-09-15 NOTE — PLAN OF CARE
Data: Yennifer Haines transferred to [facility] via wheelchair at 0015. Baby transferred via parent's arms.  Action: Receiving unit notified of transfer: Yes. Patient and family notified of room change. Report given to Stephanie TERRELL at 0020. Belongings sent to receiving unit. Accompanied by Registered Nurse. Oriented patient to surroundings. Call light within reach. ID bands double-checked with receiving RN.  Response: Patient tolerated transfer and is stable.

## 2018-09-15 NOTE — PLAN OF CARE
Problem: Patient Care Overview  Goal: Plan of Care/Patient Progress Review  Outcome: Improving  Meeting goals for shift, see flow sheet. Caring for self and infant in room with spouse and bonding well. Pain controlled. Up, steady on feet and voiding well. assisted with breast feeding this am. Anticipate discharge tomorrow.

## 2018-09-15 NOTE — PROGRESS NOTES
"OB Post-partum Note  PPD# 1    S:  Patient doing well. Had delivery w/ house OB after rapid progress to delivery. Pain controlled with ibuprofen and acetampinophen.  Voiding and ambulating w/o difficulty.  Bleeding similar to a menses.  Baby is SGA, working on breast feeding and supplementing with formula.    O:  /67  Pulse 83  Temp 98  F (36.7  C) (Oral)  Resp 18  Ht 1.626 m (5' 4\")  Wt 59 kg (130 lb)  Breastfeeding? Unknown  BMI 22.31 kg/m2  Gen- A&O, NAD  Abd- Non-tender, fundus firm per recent evalation. Pt currently ambulating around room  Ext- non-tender, minimal edema    Hemoglobin   Date Value Ref Range Status   09/15/2018 11.2 (L) 11.7 - 15.7 g/dL Final     O+  Rubella Immune    A/P: 23 year old  PPD# 1 s/p NVD s/p IOL for suspected IUGR    1.  Routine post-partum cares.  2.  Anticipate d/c home tomorrow.    Lupe Espinoza CNM  9/15/2018  9:11 AM  "

## 2018-09-15 NOTE — ADDENDUM NOTE
Addendum  created 09/14/18 1959 by Santi Bhatt MD    Anesthesia Event edited, Procedure Event Log accessed

## 2018-09-15 NOTE — PROVIDER NOTIFICATION
09/14/18 2015   Provider Notification   Provider Name/Title Dr. Alvarez   Method of Notification Phone   Request Evaluate - Remote   Notification Reason Pain;SVE   Up dated with SVE and Patient feeling vaginal pressure after epidural and rebolus.  Continue POC

## 2018-09-15 NOTE — PLAN OF CARE
Problem: Patient Care Overview  Goal: Plan of Care/Patient Progress Review  Outcome: Improving  Patient vital signs stable and meeting expected outcomes.  Breastfeeding fair with some assistance from staff with positioning and latch.  Mother also supplementing with formula.  Up independently and voiding adequately.  Encouraged frequent voiding.  IV saline locked.  Room orientation complete.  Able to perform all cares for self and infant.  Pt denies pain at this time.  Using ice on perineum.  Bonding well with infant.  FOB present and supportive at bedside.  Will continue to monitor.

## 2018-09-15 NOTE — PROCEDURES
IN HOUSE OB DELIVERY NOTE    I was called to room 5 for possible delivery.      The patient was a primigravida female, with an epidural, who had made rapid progress and was .    She was 38 weeks, being induced for a small baby.    The patient tried not to push, [facility]    The patient was prepped and draped, she delivered spontaneously OA after 3 contractions after I arrived.    The infant was placed on the Maternal abdomen, female infant, 5 # 4 oz, Apgar 8 @ 1min, 9 @ 5min.  Delayed cord clamping.    Placenta spontaneous, intact.    Second degree perineal, small left labial and large right labial lacerations, repaired 3-0 chromic.    EBL: 100 ml    Dr. Alvarez arrived while I was completing the repair of the lacerations.    Both Mother and infant were doing well at the end of the delivery.

## 2018-09-16 VITALS
WEIGHT: 130 LBS | DIASTOLIC BLOOD PRESSURE: 71 MMHG | BODY MASS INDEX: 22.2 KG/M2 | HEIGHT: 64 IN | HEART RATE: 83 BPM | SYSTOLIC BLOOD PRESSURE: 127 MMHG | RESPIRATION RATE: 18 BRPM | TEMPERATURE: 98.4 F

## 2018-09-16 PROCEDURE — 25000132 ZZH RX MED GY IP 250 OP 250 PS 637: Performed by: OBSTETRICS & GYNECOLOGY

## 2018-09-16 RX ADMIN — SENNOSIDES AND DOCUSATE SODIUM 1 TABLET: 8.6; 5 TABLET ORAL at 07:36

## 2018-09-16 RX ADMIN — IBUPROFEN 800 MG: 800 TABLET ORAL at 07:36

## 2018-09-16 ASSESSMENT — ACTIVITIES OF DAILY LIVING (ADL)
RETIRED_EATING: 0-->INDEPENDENT
SWALLOWING: 0-->SWALLOWS FOODS/LIQUIDS WITHOUT DIFFICULTY
BATHING: 0-->INDEPENDENT
AMBULATION: 0-->INDEPENDENT
TOILETING: 0-->INDEPENDENT
DRESS: 0-->INDEPENDENT
FALL_HISTORY_WITHIN_LAST_SIX_MONTHS: NO
COGNITION: 0 - NO COGNITION ISSUES REPORTED
TRANSFERRING: 0-->INDEPENDENT
RETIRED_COMMUNICATION: 0-->UNDERSTANDS/COMMUNICATES WITHOUT DIFFICULTY

## 2018-09-16 NOTE — PLAN OF CARE
Problem: Patient Care Overview  Goal: Plan of Care/Patient Progress Review  Outcome: Improving  Patient vital signs stable and meeting expected outcomes.  Breastfeeding well with minimal assistance from staff.  Supplementing with formula after each feed.  Up independently and voiding adequately.  Pain well controlled with ibuprofen.  Able to perform all cares for self and infant.  Bonding well with infant.  FOB present and supportive at bedside.  Will continue to monitor.

## 2018-09-16 NOTE — DISCHARGE INSTRUCTIONS
Postpartum Vaginal Delivery Instructions  Lactation [phone]  [facility] [phone]    Activity       Ask family and friends for help when you need it.    Do not place anything in your vagina for 6 weeks.    You are not restricted on other activities, but take it easy for a few weeks to allow your body to recover from delivery.  You are able to do any activities you feel up to that point.    No driving until you have stopped taking your pain medications (usually two weeks after delivery).     Call your health care provider if you have any of these symptoms:       Increased pain, swelling, redness, or fluid around your stiches from an episiotomy or perineal tear.    A fever above 100.4 F (38 C) with or without chills when placing a thermometer under your tongue.    You soak a sanitary pad with blood within 1 hour, or you see blood clots larger than a golf ball.    Bleeding that lasts more than 6 weeks.    Vaginal discharge that smells bad.    Severe pain, cramping or tenderness in your lower belly area.    A need to urinate more frequently (use the toilet more often), more urgently (use the toilet very quickly), or it burns when you urinate.    Nausea and vomiting.    Redness, swelling or pain around a vein in your leg.    Problems breastfeeding or a red or painful area on your breast.    Chest pain and cough or are gasping for air.    Problems coping with sadness, anxiety, or depression.  If you have any concerns about hurting yourself or the baby, call your provider immediately.     You have questions or concerns after you return home.     Keep your hands clean:  Always wash your hands before touching your perineal area and stitches.  This helps reduce your risk of infection.  If your hands aren't dirty, you may use an alcohol hand-rub to clean your hands. Keep your nails clean and short.

## 2018-09-16 NOTE — PLAN OF CARE
Problem: Patient Care Overview  Goal: Plan of Care/Patient Progress Review  Outcome: Improving  Stable. Motrin for discomfort.

## 2018-09-16 NOTE — LACTATION NOTE
Lactation in to see patient. Baby sga, nursing well in side lying position. Swallows heard. Encouraged frequent feeds. Basic breast feeding information given. Blood sugars stable at this time, will continue to monitor.

## 2018-09-16 NOTE — PLAN OF CARE
Problem: Patient Care Overview  Goal: Plan of Care/Patient Progress Review  Outcome: Adequate for Discharge Date Met: 09/16/18  Meeting goals for shift and discharge to home, see flow sheet. Caring for self and infant in room and bonding well. Comfortable. AVS/DC teaching and medications reviewed with patient . Patient is aware of when to call and follow-up. Patient is aware of resources available.  Aware of [place] scale and when to retake and call.Teaching is completed. No questions, home care faxed. Ready for discharge.

## 2018-09-16 NOTE — PLAN OF CARE
Problem: Patient Care Overview  Goal: Plan of Care/Patient Progress Review  Outcome: Improving  Pt in stable condition. Up ad qi and voiding without problems. Breastfeeding going very well. FOB at bedside and supportive.

## 2018-09-16 NOTE — PLAN OF CARE
Problem: Patient Care Overview  Goal: Discharge Needs Assessment  Outcome: Adequate for Discharge Date Met: 09/16/18  No questions, ready for discharge to home.

## 2018-10-02 ENCOUNTER — DOCUMENTATION ONLY (OUTPATIENT)
Dept: CARE COORDINATION | Facility: CLINIC | Age: 23
End: 2018-10-02

## 2018-10-02 NOTE — PROGRESS NOTES
[facility] will be sharing updates with you on Maternal Child Health Referral requests for home care services.  This is for care coordination purposes and alert you to referral status.  We received the referral for  Yennifer Haines; MRN [id number] and want to update you:    [facility] has made attempts to contact patient by phone and text message. We have not had any response from patient. Final message was left advising patient to follow up with Primary Care     Sincerely [facility]  Mahnaz Freed RN  [phone]

## 2019-02-13 ENCOUNTER — NURSE TRIAGE (OUTPATIENT)
Dept: NURSING | Facility: CLINIC | Age: 24
End: 2019-02-13

## 2019-02-14 NOTE — TELEPHONE ENCOUNTER
Patient called about recent dermatology appointment with Dr Phillip, this provider is at [facility]  in [place] on [address], when getting caller the number to clinic she hung up.     Bea Dale RN/ [facility]          Additional Information    General information question, no triage required and triager able to answer question    Protocols used: INFORMATION ONLY CALL-ADULT-

## 2020-03-31 LAB
C TRACH DNA SPEC QL PROBE+SIG AMP: NEGATIVE
HBV SURFACE AG SERPL QL IA: NEGATIVE
HIV 1+2 AB+HIV1 P24 AG SERPL QL IA: NEGATIVE
N GONORRHOEA DNA SPEC QL PROBE+SIG AMP: NEGATIVE
RUBELLA ABY IGG: NORMAL
TREPONEMA ANTIBODIES: NORMAL

## 2020-10-05 LAB — GROUP B STREP PCR: NEGATIVE

## 2020-10-25 ENCOUNTER — HOSPITAL ENCOUNTER (INPATIENT)
Facility: CLINIC | Age: 25
LOS: 1 days | Discharge: HOME | End: 2020-10-27
Attending: OBSTETRICS & GYNECOLOGY | Admitting: OBSTETRICS & GYNECOLOGY
Payer: COMMERCIAL

## 2020-10-25 LAB
ABO + RH BLD: NORMAL
ABO + RH BLD: NORMAL
BLD GP AB SCN SERPL QL: NORMAL
BLOOD BANK CMNT PATIENT-IMP: NORMAL
HGB BLD-MCNC: 9.8 G/DL (ref 11.7–15.7)
LABORATORY COMMENT REPORT: NORMAL
SARS-COV-2 RNA SPEC QL NAA+PROBE: NEGATIVE
SARS-COV-2 RNA SPEC QL NAA+PROBE: NORMAL
SPECIMEN EXP DATE BLD: NORMAL
SPECIMEN SOURCE: NORMAL
SPECIMEN SOURCE: NORMAL

## 2020-10-25 PROCEDURE — 250N000013 HC RX MED GY IP 250 OP 250 PS 637: Performed by: OBSTETRICS & GYNECOLOGY

## 2020-10-25 PROCEDURE — 86901 BLOOD TYPING SEROLOGIC RH(D): CPT | Performed by: OBSTETRICS & GYNECOLOGY

## 2020-10-25 PROCEDURE — 3E0P7VZ INTRODUCTION OF HORMONE INTO FEMALE REPRODUCTIVE, VIA NATURAL OR ARTIFICIAL OPENING: ICD-10-PCS | Performed by: OBSTETRICS & GYNECOLOGY

## 2020-10-25 PROCEDURE — U0003 INFECTIOUS AGENT DETECTION BY NUCLEIC ACID (DNA OR RNA); SEVERE ACUTE RESPIRATORY SYNDROME CORONAVIRUS 2 (SARS-COV-2) (CORONAVIRUS DISEASE [COVID-19]), AMPLIFIED PROBE TECHNIQUE, MAKING USE OF HIGH THROUGHPUT TECHNOLOGIES AS DESCRIBED BY CMS-2020-01-R: HCPCS | Performed by: OBSTETRICS & GYNECOLOGY

## 2020-10-25 PROCEDURE — 86850 RBC ANTIBODY SCREEN: CPT | Performed by: OBSTETRICS & GYNECOLOGY

## 2020-10-25 PROCEDURE — 80307 DRUG TEST PRSMV CHEM ANLYZR: CPT | Performed by: OBSTETRICS & GYNECOLOGY

## 2020-10-25 PROCEDURE — 86780 TREPONEMA PALLIDUM: CPT | Performed by: OBSTETRICS & GYNECOLOGY

## 2020-10-25 PROCEDURE — 86900 BLOOD TYPING SEROLOGIC ABO: CPT | Performed by: OBSTETRICS & GYNECOLOGY

## 2020-10-25 PROCEDURE — 85018 HEMOGLOBIN: CPT | Performed by: OBSTETRICS & GYNECOLOGY

## 2020-10-25 RX ORDER — LIDOCAINE 40 MG/G
CREAM TOPICAL
Status: DISCONTINUED | OUTPATIENT
Start: 2020-10-25 | End: 2020-10-26

## 2020-10-25 RX ADMIN — DINOPROSTONE 10 MG: 10 INSERT VAGINAL at 20:37

## 2020-10-25 ASSESSMENT — MIFFLIN-ST. JEOR: SCORE: 1314.68

## 2020-10-26 ENCOUNTER — ANESTHESIA EVENT (OUTPATIENT)
Dept: OBGYN | Facility: CLINIC | Age: 25
End: 2020-10-26
Payer: COMMERCIAL

## 2020-10-26 ENCOUNTER — ANESTHESIA (OUTPATIENT)
Dept: OBGYN | Facility: CLINIC | Age: 25
End: 2020-10-26
Payer: COMMERCIAL

## 2020-10-26 PROCEDURE — 722N000001 HC LABOR CARE VAGINAL DELIVERY SINGLE

## 2020-10-26 PROCEDURE — 250N000013 HC RX MED GY IP 250 OP 250 PS 637: Performed by: OBSTETRICS & GYNECOLOGY

## 2020-10-26 PROCEDURE — 0HQ9XZZ REPAIR PERINEUM SKIN, EXTERNAL APPROACH: ICD-10-PCS | Performed by: OBSTETRICS & GYNECOLOGY

## 2020-10-26 PROCEDURE — 250N000009 HC RX 250: Performed by: ANESTHESIOLOGY

## 2020-10-26 PROCEDURE — 250N000009 HC RX 250: Performed by: OBSTETRICS & GYNECOLOGY

## 2020-10-26 PROCEDURE — 250N000011 HC RX IP 250 OP 636: Performed by: OBSTETRICS & GYNECOLOGY

## 2020-10-26 PROCEDURE — 88307 TISSUE EXAM BY PATHOLOGIST: CPT | Mod: 26

## 2020-10-26 PROCEDURE — 370N000003 HC ANESTHESIA WARD SERVICE

## 2020-10-26 PROCEDURE — 120N000001 HC R&B MED SURG/OB

## 2020-10-26 PROCEDURE — 250N000011 HC RX IP 250 OP 636: Performed by: ANESTHESIOLOGY

## 2020-10-26 PROCEDURE — 88307 TISSUE EXAM BY PATHOLOGIST: CPT | Mod: TC | Performed by: OBSTETRICS & GYNECOLOGY

## 2020-10-26 PROCEDURE — 258N000003 HC RX IP 258 OP 636: Performed by: OBSTETRICS & GYNECOLOGY

## 2020-10-26 PROCEDURE — 999N000011 HC STATISTIC ANESTHESIA CASE

## 2020-10-26 PROCEDURE — 10907ZC DRAINAGE OF AMNIOTIC FLUID, THERAPEUTIC FROM PRODUCTS OF CONCEPTION, VIA NATURAL OR ARTIFICIAL OPENING: ICD-10-PCS | Performed by: OBSTETRICS & GYNECOLOGY

## 2020-10-26 RX ORDER — OXYTOCIN/0.9 % SODIUM CHLORIDE 30/500 ML
1-24 PLASTIC BAG, INJECTION (ML) INTRAVENOUS CONTINUOUS
Status: DISCONTINUED | OUTPATIENT
Start: 2020-10-26 | End: 2020-10-26

## 2020-10-26 RX ORDER — IBUPROFEN 800 MG/1
800 TABLET, FILM COATED ORAL EVERY 6 HOURS PRN
Status: DISCONTINUED | OUTPATIENT
Start: 2020-10-26 | End: 2020-10-27 | Stop reason: HOSPADM

## 2020-10-26 RX ORDER — SODIUM CHLORIDE, SODIUM LACTATE, POTASSIUM CHLORIDE, CALCIUM CHLORIDE 600; 310; 30; 20 MG/100ML; MG/100ML; MG/100ML; MG/100ML
INJECTION, SOLUTION INTRAVENOUS CONTINUOUS
Status: DISCONTINUED | OUTPATIENT
Start: 2020-10-26 | End: 2020-10-26

## 2020-10-26 RX ORDER — NALOXONE HYDROCHLORIDE 0.4 MG/ML
.1-.4 INJECTION, SOLUTION INTRAMUSCULAR; INTRAVENOUS; SUBCUTANEOUS
Status: DISCONTINUED | OUTPATIENT
Start: 2020-10-26 | End: 2020-10-26

## 2020-10-26 RX ORDER — ONDANSETRON 2 MG/ML
4 INJECTION INTRAMUSCULAR; INTRAVENOUS EVERY 6 HOURS PRN
Status: DISCONTINUED | OUTPATIENT
Start: 2020-10-26 | End: 2020-10-26

## 2020-10-26 RX ORDER — TRANEXAMIC ACID 10 MG/ML
1 INJECTION, SOLUTION INTRAVENOUS EVERY 30 MIN PRN
Status: DISCONTINUED | OUTPATIENT
Start: 2020-10-26 | End: 2020-10-27 | Stop reason: HOSPADM

## 2020-10-26 RX ORDER — ACETAMINOPHEN 325 MG/1
650 TABLET ORAL EVERY 4 HOURS PRN
Status: DISCONTINUED | OUTPATIENT
Start: 2020-10-26 | End: 2020-10-27 | Stop reason: HOSPADM

## 2020-10-26 RX ORDER — MODIFIED LANOLIN
OINTMENT (GRAM) TOPICAL
Status: DISCONTINUED | OUTPATIENT
Start: 2020-10-26 | End: 2020-10-27 | Stop reason: HOSPADM

## 2020-10-26 RX ORDER — OXYTOCIN 10 [USP'U]/ML
10 INJECTION, SOLUTION INTRAMUSCULAR; INTRAVENOUS
Status: DISCONTINUED | OUTPATIENT
Start: 2020-10-26 | End: 2020-10-27 | Stop reason: HOSPADM

## 2020-10-26 RX ORDER — LIDOCAINE 40 MG/G
CREAM TOPICAL
Status: DISCONTINUED | OUTPATIENT
Start: 2020-10-26 | End: 2020-10-26

## 2020-10-26 RX ORDER — METHYLERGONOVINE MALEATE 0.2 MG/ML
200 INJECTION INTRAVENOUS
Status: DISCONTINUED | OUTPATIENT
Start: 2020-10-26 | End: 2020-10-26

## 2020-10-26 RX ORDER — IBUPROFEN 800 MG/1
800 TABLET, FILM COATED ORAL
Status: DISCONTINUED | OUTPATIENT
Start: 2020-10-26 | End: 2020-10-26

## 2020-10-26 RX ORDER — HYDROCORTISONE 2.5 %
CREAM (GRAM) TOPICAL 3 TIMES DAILY PRN
Status: DISCONTINUED | OUTPATIENT
Start: 2020-10-26 | End: 2020-10-27 | Stop reason: HOSPADM

## 2020-10-26 RX ORDER — OXYTOCIN 10 [USP'U]/ML
10 INJECTION, SOLUTION INTRAMUSCULAR; INTRAVENOUS
Status: DISCONTINUED | OUTPATIENT
Start: 2020-10-26 | End: 2020-10-26

## 2020-10-26 RX ORDER — AMOXICILLIN 250 MG
1 CAPSULE ORAL 2 TIMES DAILY
Status: DISCONTINUED | OUTPATIENT
Start: 2020-10-26 | End: 2020-10-27 | Stop reason: HOSPADM

## 2020-10-26 RX ORDER — TRANEXAMIC ACID 10 MG/ML
1 INJECTION, SOLUTION INTRAVENOUS EVERY 30 MIN PRN
Status: DISCONTINUED | OUTPATIENT
Start: 2020-10-26 | End: 2020-10-26

## 2020-10-26 RX ORDER — OXYTOCIN/0.9 % SODIUM CHLORIDE 30/500 ML
340 PLASTIC BAG, INJECTION (ML) INTRAVENOUS CONTINUOUS PRN
Status: DISCONTINUED | OUTPATIENT
Start: 2020-10-26 | End: 2020-10-27 | Stop reason: HOSPADM

## 2020-10-26 RX ORDER — ONDANSETRON 4 MG/1
4 TABLET, ORALLY DISINTEGRATING ORAL EVERY 6 HOURS PRN
Status: DISCONTINUED | OUTPATIENT
Start: 2020-10-26 | End: 2020-10-26

## 2020-10-26 RX ORDER — BUPIVACAINE HYDROCHLORIDE 2.5 MG/ML
INJECTION, SOLUTION EPIDURAL; INFILTRATION; INTRACAUDAL PRN
Status: DISCONTINUED | OUTPATIENT
Start: 2020-10-26 | End: 2020-10-26

## 2020-10-26 RX ORDER — FENTANYL CITRATE 50 UG/ML
50-100 INJECTION, SOLUTION INTRAMUSCULAR; INTRAVENOUS
Status: DISCONTINUED | OUTPATIENT
Start: 2020-10-26 | End: 2020-10-26

## 2020-10-26 RX ORDER — CARBOPROST TROMETHAMINE 250 UG/ML
250 INJECTION, SOLUTION INTRAMUSCULAR
Status: DISCONTINUED | OUTPATIENT
Start: 2020-10-26 | End: 2020-10-26

## 2020-10-26 RX ORDER — OXYCODONE AND ACETAMINOPHEN 5; 325 MG/1; MG/1
1 TABLET ORAL
Status: DISCONTINUED | OUTPATIENT
Start: 2020-10-26 | End: 2020-10-26

## 2020-10-26 RX ORDER — OXYTOCIN/0.9 % SODIUM CHLORIDE 30/500 ML
100-340 PLASTIC BAG, INJECTION (ML) INTRAVENOUS CONTINUOUS PRN
Status: COMPLETED | OUTPATIENT
Start: 2020-10-26 | End: 2020-10-26

## 2020-10-26 RX ORDER — EPHEDRINE SULFATE 50 MG/ML
5 INJECTION, SOLUTION INTRAMUSCULAR; INTRAVENOUS; SUBCUTANEOUS
Status: DISCONTINUED | OUTPATIENT
Start: 2020-10-26 | End: 2020-10-26

## 2020-10-26 RX ORDER — ACETAMINOPHEN 325 MG/1
650 TABLET ORAL EVERY 4 HOURS PRN
Status: DISCONTINUED | OUTPATIENT
Start: 2020-10-26 | End: 2020-10-26

## 2020-10-26 RX ORDER — NALBUPHINE HYDROCHLORIDE 20 MG/ML
2.5-5 INJECTION, SOLUTION INTRAMUSCULAR; INTRAVENOUS; SUBCUTANEOUS EVERY 6 HOURS PRN
Status: DISCONTINUED | OUTPATIENT
Start: 2020-10-26 | End: 2020-10-26

## 2020-10-26 RX ORDER — NALOXONE HYDROCHLORIDE 0.4 MG/ML
.1-.4 INJECTION, SOLUTION INTRAMUSCULAR; INTRAVENOUS; SUBCUTANEOUS
Status: DISCONTINUED | OUTPATIENT
Start: 2020-10-26 | End: 2020-10-27 | Stop reason: HOSPADM

## 2020-10-26 RX ORDER — AMOXICILLIN 250 MG
2 CAPSULE ORAL 2 TIMES DAILY
Status: DISCONTINUED | OUTPATIENT
Start: 2020-10-26 | End: 2020-10-27 | Stop reason: HOSPADM

## 2020-10-26 RX ORDER — LIDOCAINE HYDROCHLORIDE AND EPINEPHRINE 15; 5 MG/ML; UG/ML
INJECTION, SOLUTION EPIDURAL PRN
Status: DISCONTINUED | OUTPATIENT
Start: 2020-10-26 | End: 2020-10-26

## 2020-10-26 RX ORDER — BISACODYL 10 MG
10 SUPPOSITORY, RECTAL RECTAL DAILY PRN
Status: DISCONTINUED | OUTPATIENT
Start: 2020-10-28 | End: 2020-10-27 | Stop reason: HOSPADM

## 2020-10-26 RX ORDER — OXYTOCIN/0.9 % SODIUM CHLORIDE 30/500 ML
100 PLASTIC BAG, INJECTION (ML) INTRAVENOUS CONTINUOUS
Status: DISCONTINUED | OUTPATIENT
Start: 2020-10-26 | End: 2020-10-27 | Stop reason: HOSPADM

## 2020-10-26 RX ADMIN — LIDOCAINE HYDROCHLORIDE,EPINEPHRINE BITARTRATE 3 ML: 15; .005 INJECTION, SOLUTION EPIDURAL; INFILTRATION; INTRACAUDAL; PERINEURAL at 11:20

## 2020-10-26 RX ADMIN — IBUPROFEN 800 MG: 800 TABLET ORAL at 20:34

## 2020-10-26 RX ADMIN — FENTANYL CITRATE 50 MCG: 50 INJECTION, SOLUTION INTRAMUSCULAR; INTRAVENOUS at 09:56

## 2020-10-26 RX ADMIN — DOCUSATE SODIUM AND SENNOSIDES 1 TABLET: 8.6; 5 TABLET ORAL at 20:34

## 2020-10-26 RX ADMIN — FENTANYL CITRATE 50 MCG: 50 INJECTION, SOLUTION INTRAMUSCULAR; INTRAVENOUS at 07:45

## 2020-10-26 RX ADMIN — Medication 2 MILLI-UNITS/MIN: at 12:34

## 2020-10-26 RX ADMIN — IBUPROFEN 800 MG: 800 TABLET ORAL at 14:34

## 2020-10-26 RX ADMIN — Medication 340 ML/HR: at 13:58

## 2020-10-26 RX ADMIN — LIDOCAINE HYDROCHLORIDE,EPINEPHRINE BITARTRATE 2 ML: 15; .005 INJECTION, SOLUTION EPIDURAL; INFILTRATION; INTRACAUDAL; PERINEURAL at 11:22

## 2020-10-26 RX ADMIN — ACETAMINOPHEN 650 MG: 325 TABLET, FILM COATED ORAL at 22:33

## 2020-10-26 RX ADMIN — SODIUM CHLORIDE, POTASSIUM CHLORIDE, SODIUM LACTATE AND CALCIUM CHLORIDE: 600; 310; 30; 20 INJECTION, SOLUTION INTRAVENOUS at 11:38

## 2020-10-26 RX ADMIN — Medication: at 11:31

## 2020-10-26 RX ADMIN — SODIUM CHLORIDE, POTASSIUM CHLORIDE, SODIUM LACTATE AND CALCIUM CHLORIDE: 600; 310; 30; 20 INJECTION, SOLUTION INTRAVENOUS at 07:49

## 2020-10-26 RX ADMIN — BUPIVACAINE HYDROCHLORIDE 10 ML: 2.5 INJECTION, SOLUTION EPIDURAL; INFILTRATION; INTRACAUDAL at 11:26

## 2020-10-26 NOTE — PROVIDER NOTIFICATION
10/25/20 8870   Provider Notification   Provider Name/Title Dr. Amin   Method of Notification Phone   MD called unit for an update. Updated MD that patient received cervidil, COVID test is pending, patient is nilsa 4-7 min apart, feeling some cramping. FHT's moderate variability with accels, baseline is at 110-115.   Notified MD that last delivery 2 years ago, when patient finally was dilated to 3 she received an epidural, and went to complete within 2 hours, MD missed the delivery ( in house MD delivered), and delivered a 5lb2oz baby girl. Just to plan for tomorrow that things moved very quick after an epidural.   Received orders to continue plan of care.

## 2020-10-26 NOTE — PLAN OF CARE
Urine tox screen sent due to the possible anticipation of needing to send a cord segment after delivery due to baby being IUGR, and possible SGA.

## 2020-10-26 NOTE — PROVIDER NOTIFICATION
Provider at bedside AROM clear fluid. SVE 3/75/-2, Category 1 tracing. Patient may have epidural when requesting.

## 2020-10-26 NOTE — PROVIDER NOTIFICATION
10/25/20 2005   Provider Notification   Provider Name/Title Dr. Amin   Method of Notification Phone   MD called unit to give orders for Cervidil cervical ripening. Updated MD that patient has been on unit for about a 1/2 hour, so have not checked SVE. Updated MD that patient will need a COVID swab, received orders for that.   Gave orders for standard labs, may have light foods to eat for tonight. Call if needed.

## 2020-10-26 NOTE — ANESTHESIA PROCEDURE NOTES
Procedure note : epidural catheter      Staff -   Anesthesiologist:  Eduardo Russo MD  Performed By: anesthesiologist  Referred By: Stephanie  Pre-Procedure  Performed by Eduardo Russo MD  Referred by Stephanie  Location: OB    Procedure Times:10/26/2020 11:10 AM and 10/26/2020 11:27 AM  Pre-Anesthestic Checklist: patient identified, IV checked, risks and benefits discussed, informed consent, monitors and equipment checked, pre-op evaluation and at physician/surgeon's request    Timeout  Correct Patient: Yes   Correct Procedure: Yes   Correct Site: Yes   Correct Laterality: N/A   Correct Position: Yes   Site Marked: N/A   .   Procedure Documentation    Diagnosis:labor.    Procedure: epidural catheter, .   Patient Position:sitting Insertion Site:L3-4  (midline approach) Injection technique: LORT saline   Local skin infiltrated with 2 mL of 1% lidocaine.  WANDA at 4 cm    Patient Prep/Sterile Barriers; mask, sterile gloves, povidone-iodine 7.5% surgical scrub, patient draped.  .  Needle: Touhy needle   Needle Gauge: 17.    Needle Length (Inches) 3.5   # of attempts: 1 and # of redirects:  .    Catheter: 19 G . .  Catheter threaded easily  .  9 cm at skin.   .    Assessment/Narrative  Paresthesias: No.  .  .  Aspiration negative for heme or CSF  . Test dose of 3 mL lidocaine 1.5% w/ 1:200,000 epinephrine at. Test dose negative for signs of intravascular, subdural or intrathecal injection.

## 2020-10-26 NOTE — PROGRESS NOTES
"OB Progress Note  10/26/2020  9:30 AM    S:  Pt with adequate pain control. Pt had Fentanyl and plans an epidural. She went fast with her first delivery, therefore the patient was advised not to wait on the epidural.  No other complaints.      O:  /70   Pulse 79   Temp 97.9  F (36.6  C) (Oral)   Resp 18   Ht 1.626 m (5' 4\")   Wt 59 kg (130 lb)   LMP 2020 (Exact Date)   BMI 22.31 kg/m    EFM: baseline 125, accelerations present, no decelerations, moderate variability; Category 1  [place]:  Ctx q3-5 min  SVE:  3/75%/-2  Membranes: AROM large amount of clear fluid.     Covid negative    Pitocin at 0 mU/min    A/P:  25 year old  @39w1d admitted for induction of labor due to asymmetric IUGR. EFW 6#.   1.  Routine cares  2.  Epidural  3.  Anticipate     Dori Brown MD    "

## 2020-10-26 NOTE — PROVIDER NOTIFICATION
Dr. Brown updated on recurrent variable decels, pitocin off, patient repositioned, fluid flush going. SVE 9.5/100/0. MD on her way.

## 2020-10-26 NOTE — L&D DELIVERY NOTE
OB Vaginal Delivery Note      HPI:  Pt is a 25 year old  @ 39w1d who presented to L&D on 10/25/2020 for induction of labor for asymmetric IUGR.            Prenatal labs:  O+antibody screen: negative, Rubella immune,  Hep B/HIV/RPR all negative, GC/CT negative, ;74,80,79,86, GBS negative    Pregnancy complications:  Asymmetric IUGR, LSIL pap-needs colposcopy PP, abnormal GT, normal 3 hour GT;    Hospital Course:    First Stage: On admission, the pt was  50%/1/-2 dilated. FHTs were in the 115 with accelerations present. moderate variability,  no decelerations noted.   Abdomen was non-tender.  EFW was 6#  by Leopold's.  Patient's labor was induced with Cervidil.    At the patient's request, she received Fentanyl and epidural analgesia.  She did receive pitocin for augmentation of labor, to a maximum of 2 mu/min.  AROM occurred at 0925 with clear fluid noted.  Patient reached complete cervical dilation at 1335 on 10/26/2020.    Second Stage:  Patient did not  labor down , and began pushing at 1337.  Good maternal expulsive efforts were noted.  Fetal heart tones remained reassuring during the second stage.  Baseline 125, with moderate variability, variable decelerations noted.  She was able to bring the fetal vertex to a full crown.  The fetal vertex was then easily delivered, followed by the fetal shoulders without complications.  A  nuchal cord was  present.  A male infant was then delivered without complications at 1351 on 10/26/2020.  The mouth and nares were bulb suctioned.  The cord was clamped after it had stopped pulsating, cut and the infant was placed on the mother's abdomen. NNP was present.  The infant's weight was pending.  Apgars were 9 and 9 at one and five minutes .       Third Stage:  The placenta then delivered at 1357 .  It was noted to be intact with a three vessel cord.  The patient's perineum was inspected and there were bilateral periurethral lacerations and a first degree tear.  The  area was repaired in the usual fashion using 3-0 vicryl suture.  EBL for the procedure was 180cc.    Sponge and needle counts were correct.  The patient and infant remained in the delivery suite following delivery in stable condition.    Dori Brown MD  10/26/2020  2:17 PM

## 2020-10-26 NOTE — PLAN OF CARE
Data: Yennifer Haines transferred to 426 via wheelchair at 1650. Baby transferred via parent's arms.  Action: Receiving unit notified of transfer: Yes. Patient and family notified of room change. Report given to NIDHI Mayo at 1700. Belongings sent to receiving unit. Accompanied by Registered Nurse. Oriented patient to surroundings. Call light within reach. ID bands double-checked with receiving RN.  Response: Patient tolerated transfer and is stable.

## 2020-10-26 NOTE — PROVIDER NOTIFICATION
10/26/20 0719   Provider Notification   Provider Name/Title Dr. Amin   Method of Notification Phone   Request Evaluate - Remote   Notification Reason Labor Status;Uterine Activity;Pain;Status Update;SVE   Updated MD of removal of Cervidil at 0628 due to Tachysystole. Encouraged patient to empty bladder, in which she did. Updated MD that SVE was checked, which is 2/70/-2, patient is now starting to feel uncomfortable, wanting something for pain. Notified MD that a T- pump was ordered. Asking MD if we can now include the intrapartum orders now.   Received orders to add Intrapartum orders.

## 2020-10-26 NOTE — H&P
"OB Brief Admit H&P    No significant change in general health status based on examination of the patient, review of Nursing Admission Database and prenatal record.    Pt is a 25 year old  @ 39w0d who presented to L&D for induction of labor secondary to IUGR (EFW 5lb6oz at 36 weeks, AC in 10%).    Patient's prenatal course has been complicated by:   1. History of IUGR in 1st pregnancy (5lb4oz at 38 weeks) - fast labor,, delivered by in-house MD  2. LSIL Pap this pregnancy  3. Hx anxiety  4. Elevated 1hr, normal 3hr GCT  5. Anemia - Hgb 9.4 at 36 weeks    Prenatal Labs:    Blood type O+  Rubella immune    (3hr = 74/80/79/86)  GBS negative    EFW: 6lb    /72   Pulse 79   Resp 20   Ht 1.626 m (5' 4\")   Wt 59 kg (130 lb)   LMP 2020 (Exact Date)   BMI 22.31 kg/m    EFM: Baseline 110, moderate variability, + accels, no decels, Cat I  [place]: every 3-10min  SVE: 1.5/50%/-2  Membranes:  intact    Assessment:  25 year old  @ 39w0d admitted for IOL secondary to suspected asymmetric IUGR.    Plan:  1. Admit to labor and delivery   2. IOL: Plan Cervidil overnight d/t IUGR. AROM/Pitocin in AM.  3. S/p TDAP and flu shot this pregnancy  4. COVID negative    Dian Amin MD  10/25/2020  11:57 PM      "

## 2020-10-26 NOTE — PROVIDER NOTIFICATION
Dr. Brown updated on epidural placement, SVE 4/80/-2. Intermittent variables throughout AM, accels present. Contractions have spaced out to 2-5min since epidural placement. Okay to start pitocin per MD.

## 2020-10-27 VITALS
RESPIRATION RATE: 18 BRPM | SYSTOLIC BLOOD PRESSURE: 132 MMHG | HEIGHT: 64 IN | HEART RATE: 95 BPM | BODY MASS INDEX: 22.2 KG/M2 | WEIGHT: 130 LBS | TEMPERATURE: 98 F | OXYGEN SATURATION: 99 % | DIASTOLIC BLOOD PRESSURE: 74 MMHG

## 2020-10-27 LAB — HGB BLD-MCNC: 9.6 G/DL (ref 11.7–15.7)

## 2020-10-27 PROCEDURE — 36415 COLL VENOUS BLD VENIPUNCTURE: CPT | Performed by: OBSTETRICS & GYNECOLOGY

## 2020-10-27 PROCEDURE — 250N000013 HC RX MED GY IP 250 OP 250 PS 637: Performed by: OBSTETRICS & GYNECOLOGY

## 2020-10-27 PROCEDURE — 85018 HEMOGLOBIN: CPT | Performed by: OBSTETRICS & GYNECOLOGY

## 2020-10-27 RX ORDER — ACETAMINOPHEN 325 MG/1
975 TABLET ORAL EVERY 6 HOURS PRN
Qty: 60 TABLET | Refills: 0 | Status: ON HOLD | OUTPATIENT
Start: 2020-10-27 | End: 2023-04-21

## 2020-10-27 RX ADMIN — IBUPROFEN 800 MG: 800 TABLET ORAL at 10:41

## 2020-10-27 RX ADMIN — IBUPROFEN 800 MG: 800 TABLET ORAL at 04:16

## 2020-10-27 RX ADMIN — DOCUSATE SODIUM AND SENNOSIDES 1 TABLET: 8.6; 5 TABLET ORAL at 08:36

## 2020-10-27 RX ADMIN — IBUPROFEN 800 MG: 800 TABLET ORAL at 17:08

## 2020-10-27 NOTE — ANESTHESIA POSTPROCEDURE EVALUATION
Patient: Yennifer Haines      S/P epidural for labor.   I or my partner was immediately available for management of this patient during epidural analgesia infusion.  VSS.  Doing well. Block resolved.  Neuro at baseline. Denies positional headache. Minimal side effects easily managed w/ PRN meds. No apparent anesthetic complications. No follow-up required.    Saúl Arroyo MD    Note:  Anesthesia Post Evaluation    Last vitals:  Vitals:    10/27/20 0120 10/27/20 0826 10/27/20 1041   BP: 135/88 117/71    Pulse: 60 75    Resp: 16 16 16   Temp: 97.7  F (36.5  C) 98  F (36.7  C)    SpO2:            Electronically Signed By: Saúl Arroyo MD  October 27, 2020  11:46 AM

## 2020-10-27 NOTE — PROGRESS NOTES
"OB Post-partum Note  PPD# 1    S:  Patient doing well.  Pain controlled.  Voiding.  Bleeding is normal.  Breast feeding.  Desires discharge home today.    O:  /88   Pulse 60   Temp 97.7  F (36.5  C) (Oral)   Resp 16   Ht 1.626 m (5' 4\")   Wt 59 kg (130 lb)   LMP 2020 (Exact Date)   SpO2 99%   Breastfeeding yes   BMI 22.31 kg/m    Gen- A&O, NAD  Abd- Non-tender, fundus non tender and firm   Ext- non-tender, no calf pain    Hemoglobin   Date Value Ref Range Status   10/27/2020 9.6 (L) 11.7 - 15.7 g/dL Final     O+  Rubella Immune  Covid neg    A/P: 25 year old  PPD# 1 s/p     1.  Routine post-partum cares  2.  Analgesia tylenol and ibuprofen (requests rx for tylenol, has ibuprofen at home)  3.  Discharge today per patient request  4.  The plan of care was discussed with the patient.  She expressed understanding and agreement  5.  RTC in 6 weeks  6.  Indications to call or return were discussed. Post partum instructions were given  7. Acute blood loss anemia, mild - continue Fe supplement.      Xenia Langfrod MD  10/27/2020  8:18 AM  "

## 2020-10-27 NOTE — DISCHARGE INSTRUCTIONS
Make postpartum visit with OBGYN for 6 weeks.     Postpartum Vaginal Delivery Instructions    Activity       Ask family and friends for help when you need it.    Do not place anything in your vagina for 6 weeks.    You are not restricted on other activities, but take it easy for a few weeks to allow your body to recover from delivery.  You are able to do any activities you feel up to that point.    No driving until you have stopped taking your pain medications (usually two weeks after delivery).     Call your health care provider if you have any of these symptoms:       Increased pain, swelling, redness, or fluid around your stiches from an episiotomy or perineal tear.    A fever above 100.4 F (38 C) with or without chills when placing a thermometer under your tongue.    You soak a sanitary pad with blood within 1 hour, or you see blood clots larger than a golf ball.    Bleeding that lasts more than 6 weeks.    Vaginal discharge that smells bad.    Severe pain, cramping or tenderness in your lower belly area.    A need to urinate more frequently (use the toilet more often), more urgently (use the toilet very quickly), or it burns when you urinate.    Nausea and vomiting.    Redness, swelling or pain around a vein in your leg.    Problems breastfeeding or a red or painful area on your breast.    Chest pain and cough or are gasping for air.    Problems coping with sadness, anxiety, or depression.  If you have any concerns about hurting yourself or the baby, call your provider immediately.     You have questions or concerns after you return home.     Keep your hands clean:  Always wash your hands before touching your perineal area and stitches.  This helps reduce your risk of infection.  If your hands aren't dirty, you may use an alcohol hand-rub to clean your hands. Keep your nails clean and short.

## 2020-10-27 NOTE — PLAN OF CARE
Patient meeting expected outcomes. Breastfeeding going well per patient statement. Pain well managed with tylenol, ibuprofen and heat. Declined Zoloft this evening, stated she doesn't plan on restarting until she meets with her psychologist. Independent with self and  cares.

## 2020-10-27 NOTE — PLAN OF CARE
Vitals stable. Independent with cares. Pain controlled with Ibuprofen with good relief upon reassessment. Breastfeeding with no assistance needed. Would like early discharge if possible. Significant other at bedside and supportive. Bonding well with baby.

## 2020-10-27 NOTE — LACTATION NOTE
This note was copied from a baby's chart.  LC visit. Infant had gone a longer stretch in which he was disinterested in nursing.  LC offered assistance with latching and positioning.  Swaddler was removed, STS latch assisted in a cradle hold on the left with breast support.  Infant needed mouth exercises to coordinate suck, but once coordinated, was able to latch and maintain a nutritive suck pattern. Excellent volumes of colostrum are observed with HE and frequent swallows heard.  LC also stayed and observed a switch to the second side. LC recommended nursing on both breasts for each feeding, avoiding pacifier use until infant's feeding patterns are well established and infant is at least 2 weeks old, and LC discussed awakening infant to nurse at a minimum of every 3 hours or on demand.  All questions answered.  Pumping recommendations were reviewed and  resources were referenced.

## 2020-10-27 NOTE — PLAN OF CARE
Pt. vitals stable. Pain managed with PRN ibuprofen. Independent in infant and personal cares. Breastfeeding infant. Attentive to infant needs and bonding well with infant. FOB at bedside, supportive. Discharge instructions provided, questions answered. Follow up recommendations discussed, patient voiced understanding. Has breast pump at home. Discharged home with infant @ 1720.

## 2020-10-28 LAB — COPATH REPORT: NORMAL

## 2021-01-15 ENCOUNTER — HEALTH MAINTENANCE LETTER (OUTPATIENT)
Age: 26
End: 2021-01-15

## 2021-09-05 ENCOUNTER — HEALTH MAINTENANCE LETTER (OUTPATIENT)
Age: 26
End: 2021-09-05

## 2021-09-21 NOTE — ANESTHESIA PREPROCEDURE EVALUATION
Anesthesia Pre-Procedure Evaluation    Patient: Yennifer Haines   MRN: [id number] : 1995          Preoperative Diagnosis: * No surgery found *        Past Medical History:   Diagnosis Date     Depressive disorder      Heroin abuse (H)     Treatment      Past Surgical History:   Procedure Laterality Date     DENTAL SURGERY       ORTHOPEDIC SURGERY Bilateral     Bunions     ORTHOPEDIC SURGERY Bilateral childhood    arm surgery 1 on left and 4x right     wisdom teeth       Anesthesia Evaluation       history and physical reviewed . Pt has had prior anesthetic. Type: Regional    No history of anesthetic complications          ROS/MED HX    ENT/Pulmonary:  - neg pulmonary ROS     Neurologic:  - neg neurologic ROS     Cardiovascular:  - neg cardiovascular ROS       METS/Exercise Tolerance:     Hematologic:         Musculoskeletal:         GI/Hepatic:     (+) GERD       Renal/Genitourinary:         Endo:         Psychiatric: Comment: H/O heroin abuse in     (+) psychiatric history depression and other (comment)      Infectious Disease:         Malignancy:         Other:                       (-) no pre-eclampsia and gestational diabetes    Hx Heroin Abuse  Depression      Physical Exam  Normal systems: cardiovascular and pulmonary    Airway   Mallampati: II  TM distance: > 3 FB  Neck ROM: full  Mouth opening: > 3 cm    Dental     Cardiovascular       Pulmonary     Other findings: Lab Test        10/25/20     09/15/18     09/13/18     03/20/13                       2050          0624          2150          1114          WBC           --           --           --          7.0           HGB          9.8*         11.2*        12.1         13.3          MCV           --           --           --          95            PLT           --           --           --          275            Lab Test        13                       1114          NA           142           POTASSIUM    3.8           CHLORIDE   Called mom and discussed school physical paperwork was filled out and ready to be picked up.   "   104           CO2          25            BUN          9             CR           0.63          ANIONGAP     12            QUANG          9.2           GLC          71                  Lab Results   Component Value Date    WBC 7.0 03/20/2013    HGB 9.8 (L) 10/25/2020    HCT 39.8 03/20/2013     03/20/2013     03/20/2013    POTASSIUM 3.8 03/20/2013    CHLORIDE 104 03/20/2013    CO2 25 03/20/2013    BUN 9 03/20/2013    CR 0.63 03/20/2013    GLC 71 03/20/2013    QUANG 9.2 03/20/2013    ALBUMIN 4.4 03/20/2013    PROTTOTAL 7.5 03/20/2013    ALT 27 03/20/2013    AST 24 03/20/2013    ALKPHOS 87 03/20/2013    BILITOTAL 0.2 03/20/2013    HCG NEGATIVE 04/16/2015       Preop Vitals  BP Readings from Last 3 Encounters:   10/26/20 120/75   09/16/18 127/71   01/25/17 136/86    Pulse Readings from Last 3 Encounters:   10/25/20 79   09/14/18 83   05/28/15 78      Resp Readings from Last 3 Encounters:   10/26/20 18   09/16/18 18   01/25/17 18    SpO2 Readings from Last 3 Encounters:   01/25/17 100%   05/28/15 93%   04/16/15 97%      Temp Readings from Last 1 Encounters:   10/26/20 98.1  F (36.7  C) (Oral)    Ht Readings from Last 1 Encounters:   10/25/20 1.626 m (5' 4\")      Wt Readings from Last 1 Encounters:   10/25/20 59 kg (130 lb)    Estimated body mass index is 22.31 kg/m  as calculated from the following:    Height as of this encounter: 1.626 m (5' 4\").    Weight as of this encounter: 59 kg (130 lb).       Anesthesia Plan  Procedure only, no anesthetic delivered    History & Physical Review  History and physical reviewed and following examination; no interval change.    ASA Status:  2 .  OB Epidural Asa: 2       Plan for Epidural       Continuous Labor Epidural: Indication is for labor pain. Following an discussion of the expectations, benefits and risks (including but not limited to nerve damage, infection, bleeding, spinal headache, partial or failed block), the patient appears to understand and consents to " proceed. Questions were encouraged and answered.           Postoperative Care      Consents  Anesthetic plan, risks, benefits and alternatives discussed with:  Patient and Patient..                 Monico Frazier MD                    .

## 2022-02-19 ENCOUNTER — HEALTH MAINTENANCE LETTER (OUTPATIENT)
Age: 27
End: 2022-02-19

## 2022-10-22 ENCOUNTER — HEALTH MAINTENANCE LETTER (OUTPATIENT)
Age: 27
End: 2022-10-22

## 2022-10-31 ENCOUNTER — LAB REQUISITION (OUTPATIENT)
Dept: LAB | Facility: CLINIC | Age: 27
End: 2022-10-31

## 2022-10-31 DIAGNOSIS — Z34.91 ENCOUNTER FOR SUPERVISION OF NORMAL PREGNANCY, UNSPECIFIED, FIRST TRIMESTER: ICD-10-CM

## 2022-10-31 LAB
BASOPHILS # BLD AUTO: 0.1 10E3/UL (ref 0–0.2)
BASOPHILS NFR BLD AUTO: 1 %
EOSINOPHIL # BLD AUTO: 0.1 10E3/UL (ref 0–0.7)
EOSINOPHIL NFR BLD AUTO: 1 %
ERYTHROCYTE [DISTWIDTH] IN BLOOD BY AUTOMATED COUNT: 11.8 % (ref 10–15)
HCT VFR BLD AUTO: 38.9 % (ref 35–47)
HGB BLD-MCNC: 13.1 G/DL (ref 11.7–15.7)
IMM GRANULOCYTES # BLD: 0 10E3/UL
IMM GRANULOCYTES NFR BLD: 0 %
LYMPHOCYTES # BLD AUTO: 2.6 10E3/UL (ref 0.8–5.3)
LYMPHOCYTES NFR BLD AUTO: 32 %
MCH RBC QN AUTO: 31.3 PG (ref 26.5–33)
MCHC RBC AUTO-ENTMCNC: 33.7 G/DL (ref 31.5–36.5)
MCV RBC AUTO: 93 FL (ref 78–100)
MONOCYTES # BLD AUTO: 0.6 10E3/UL (ref 0–1.3)
MONOCYTES NFR BLD AUTO: 7 %
NEUTROPHILS # BLD AUTO: 4.7 10E3/UL (ref 1.6–8.3)
NEUTROPHILS NFR BLD AUTO: 59 %
NRBC # BLD AUTO: 0 10E3/UL
NRBC BLD AUTO-RTO: 0 /100
PLATELET # BLD AUTO: 219 10E3/UL (ref 150–450)
RBC # BLD AUTO: 4.19 10E6/UL (ref 3.8–5.2)
WBC # BLD AUTO: 8.1 10E3/UL (ref 4–11)

## 2022-10-31 PROCEDURE — 87389 HIV-1 AG W/HIV-1&-2 AB AG IA: CPT | Performed by: OBSTETRICS & GYNECOLOGY

## 2022-10-31 PROCEDURE — 85025 COMPLETE CBC W/AUTO DIFF WBC: CPT | Performed by: OBSTETRICS & GYNECOLOGY

## 2022-10-31 PROCEDURE — 87340 HEPATITIS B SURFACE AG IA: CPT | Performed by: OBSTETRICS & GYNECOLOGY

## 2022-10-31 PROCEDURE — 86803 HEPATITIS C AB TEST: CPT | Performed by: OBSTETRICS & GYNECOLOGY

## 2022-11-01 LAB
HBV SURFACE AG SERPL QL IA: NONREACTIVE
HCV AB SERPL QL IA: NONREACTIVE
HIV 1+2 AB+HIV1 P24 AG SERPL QL IA: NONREACTIVE

## 2023-04-03 ENCOUNTER — LAB REQUISITION (OUTPATIENT)
Dept: LAB | Facility: CLINIC | Age: 28
End: 2023-04-03
Payer: COMMERCIAL

## 2023-04-03 DIAGNOSIS — Z36.89 ENCOUNTER FOR OTHER SPECIFIED ANTENATAL SCREENING: ICD-10-CM

## 2023-04-03 LAB
GLUCOSE 1H P 50 G GLC PO SERPL-MCNC: 130 MG/DL (ref 70–129)
HGB BLD-MCNC: 11.3 G/DL (ref 11.7–15.7)

## 2023-04-03 PROCEDURE — 85018 HEMOGLOBIN: CPT | Mod: ORL | Performed by: OBSTETRICS & GYNECOLOGY

## 2023-04-03 PROCEDURE — 86592 SYPHILIS TEST NON-TREP QUAL: CPT | Mod: ORL | Performed by: OBSTETRICS & GYNECOLOGY

## 2023-04-03 PROCEDURE — 82950 GLUCOSE TEST: CPT | Mod: ORL | Performed by: OBSTETRICS & GYNECOLOGY

## 2023-04-04 LAB — RPR SER QL: NONREACTIVE

## 2023-04-21 ENCOUNTER — HOSPITAL ENCOUNTER (OUTPATIENT)
Facility: CLINIC | Age: 28
Discharge: HOME | End: 2023-04-22
Attending: OBSTETRICS & GYNECOLOGY | Admitting: OBSTETRICS & GYNECOLOGY
Payer: COMMERCIAL

## 2023-04-21 DIAGNOSIS — O47.00 PRETERM CONTRACTIONS: Primary | ICD-10-CM

## 2023-04-21 PROBLEM — Z36.89 ENCOUNTER FOR TRIAGE IN PREGNANT PATIENT: Status: ACTIVE | Noted: 2023-04-21

## 2023-04-21 LAB
ALBUMIN UR-MCNC: NEGATIVE MG/DL
AMORPH CRY #/AREA URNS HPF: ABNORMAL /HPF
APPEARANCE UR: CLEAR
BILIRUB UR QL STRIP: NEGATIVE
COLOR UR AUTO: ABNORMAL
FFN SPECIMEN INTEGRITY: ABNORMAL
FIBRONECTIN FETAL VAG QL: POSITIVE
GLUCOSE UR STRIP-MCNC: NEGATIVE MG/DL
HGB UR QL STRIP: NEGATIVE
KETONES UR STRIP-MCNC: NEGATIVE MG/DL
LEUKOCYTE ESTERASE UR QL STRIP: ABNORMAL
MUCOUS THREADS #/AREA URNS LPF: PRESENT /LPF
NITRATE UR QL: NEGATIVE
PH UR STRIP: 6.5 [PH] (ref 5–7)
RBC URINE: 1 /HPF
SP GR UR STRIP: 1.01 (ref 1–1.03)
SQUAMOUS EPITHELIAL: 1 /HPF
UROBILINOGEN UR STRIP-MCNC: NORMAL MG/DL
WBC URINE: 5 /HPF

## 2023-04-21 PROCEDURE — 81001 URINALYSIS AUTO W/SCOPE: CPT | Performed by: OBSTETRICS & GYNECOLOGY

## 2023-04-21 PROCEDURE — 82731 ASSAY OF FETAL FIBRONECTIN: CPT | Performed by: OBSTETRICS & GYNECOLOGY

## 2023-04-21 PROCEDURE — G0463 HOSPITAL OUTPT CLINIC VISIT: HCPCS | Mod: 25

## 2023-04-21 RX ORDER — FLUCONAZOLE 150 MG/1
150 TABLET ORAL DAILY
Status: DISCONTINUED | OUTPATIENT
Start: 2023-04-22 | End: 2023-04-21

## 2023-04-21 RX ORDER — LIDOCAINE 40 MG/G
CREAM TOPICAL
Status: DISCONTINUED | OUTPATIENT
Start: 2023-04-21 | End: 2023-04-22 | Stop reason: HOSPADM

## 2023-04-21 ASSESSMENT — ACTIVITIES OF DAILY LIVING (ADL): ADLS_ACUITY_SCORE: 33

## 2023-04-22 ENCOUNTER — HOSPITAL ENCOUNTER (OUTPATIENT)
Facility: CLINIC | Age: 28
End: 2023-04-22
Admitting: OBSTETRICS & GYNECOLOGY
Payer: COMMERCIAL

## 2023-04-22 VITALS — SYSTOLIC BLOOD PRESSURE: 110 MMHG | RESPIRATION RATE: 16 BRPM | TEMPERATURE: 98.2 F | DIASTOLIC BLOOD PRESSURE: 60 MMHG

## 2023-04-22 PROCEDURE — 250N000011 HC RX IP 250 OP 636: Performed by: OBSTETRICS & GYNECOLOGY

## 2023-04-22 PROCEDURE — 96360 HYDRATION IV INFUSION INIT: CPT

## 2023-04-22 PROCEDURE — G0463 HOSPITAL OUTPT CLINIC VISIT: HCPCS | Mod: 25

## 2023-04-22 PROCEDURE — 250N000013 HC RX MED GY IP 250 OP 250 PS 637: Performed by: OBSTETRICS & GYNECOLOGY

## 2023-04-22 PROCEDURE — 258N000003 HC RX IP 258 OP 636: Performed by: OBSTETRICS & GYNECOLOGY

## 2023-04-22 PROCEDURE — 96372 THER/PROPH/DIAG INJ SC/IM: CPT | Performed by: OBSTETRICS & GYNECOLOGY

## 2023-04-22 RX ORDER — BETAMETHASONE SODIUM PHOSPHATE AND BETAMETHASONE ACETATE 3; 3 MG/ML; MG/ML
12 INJECTION, SUSPENSION INTRA-ARTICULAR; INTRALESIONAL; INTRAMUSCULAR; SOFT TISSUE EVERY 24 HOURS
Status: DISCONTINUED | OUTPATIENT
Start: 2023-04-22 | End: 2023-04-22 | Stop reason: HOSPADM

## 2023-04-22 RX ORDER — NIFEDIPINE 10 MG/1
20 CAPSULE ORAL EVERY 6 HOURS
Status: DISCONTINUED | OUTPATIENT
Start: 2023-04-22 | End: 2023-04-22

## 2023-04-22 RX ORDER — HYDROXYZINE HYDROCHLORIDE 50 MG/1
50 TABLET, FILM COATED ORAL EVERY 6 HOURS PRN
Status: DISCONTINUED | OUTPATIENT
Start: 2023-04-22 | End: 2023-04-22 | Stop reason: HOSPADM

## 2023-04-22 RX ORDER — BETAMETHASONE SODIUM PHOSPHATE AND BETAMETHASONE ACETATE 3; 3 MG/ML; MG/ML
12 INJECTION, SUSPENSION INTRA-ARTICULAR; INTRALESIONAL; INTRAMUSCULAR; SOFT TISSUE ONCE
Status: COMPLETED | OUTPATIENT
Start: 2023-04-22 | End: 2023-04-22

## 2023-04-22 RX ORDER — ACETAMINOPHEN 325 MG/1
650 TABLET ORAL EVERY 4 HOURS PRN
Status: DISCONTINUED | OUTPATIENT
Start: 2023-04-22 | End: 2023-04-22 | Stop reason: HOSPADM

## 2023-04-22 RX ORDER — NIFEDIPINE 10 MG/1
10 CAPSULE ORAL EVERY 4 HOURS
Status: DISCONTINUED | OUTPATIENT
Start: 2023-04-22 | End: 2023-04-22 | Stop reason: HOSPADM

## 2023-04-22 RX ORDER — NIFEDIPINE 10 MG/1
10 CAPSULE ORAL EVERY 4 HOURS
Qty: 30 CAPSULE | Refills: 0 | Status: ON HOLD | OUTPATIENT
Start: 2023-04-22 | End: 2023-06-12

## 2023-04-22 RX ADMIN — HYDROXYZINE HYDROCHLORIDE 50 MG: 50 TABLET, FILM COATED ORAL at 03:53

## 2023-04-22 RX ADMIN — NIFEDIPINE 20 MG: 10 CAPSULE ORAL at 00:19

## 2023-04-22 RX ADMIN — ACETAMINOPHEN 650 MG: 325 TABLET ORAL at 02:50

## 2023-04-22 RX ADMIN — SODIUM CHLORIDE, POTASSIUM CHLORIDE, SODIUM LACTATE AND CALCIUM CHLORIDE 1000 ML: 600; 310; 30; 20 INJECTION, SOLUTION INTRAVENOUS at 00:58

## 2023-04-22 RX ADMIN — BETAMETHASONE SODIUM PHOSPHATE AND BETAMETHASONE ACETATE 12 MG: 3; 3 INJECTION, SUSPENSION INTRA-ARTICULAR; INTRALESIONAL; INTRAMUSCULAR at 00:46

## 2023-04-22 RX ADMIN — BETAMETHASONE SODIUM PHOSPHATE AND BETAMETHASONE ACETATE 12 MG: 3; 3 INJECTION, SUSPENSION INTRA-ARTICULAR; INTRALESIONAL; INTRAMUSCULAR at 20:51

## 2023-04-22 RX ADMIN — NIFEDIPINE 20 MG: 10 CAPSULE ORAL at 06:16

## 2023-04-22 ASSESSMENT — ACTIVITIES OF DAILY LIVING (ADL)
ADLS_ACUITY_SCORE: 33

## 2023-04-22 NOTE — PROVIDER NOTIFICATION
04/21/23 5544   Provider Notification   Provider Name/Title Dr. Alvarez   Method of Notification Phone   MD notified of patient arrival and admitting complaint of uterine contractions. G+P, gestation reviewed. She states she had sex at 1800 and shortly after uterine contractions began; rates at 5/10 and contractions palpate moderate. TOCO shows uterine activity every 2-3 minutes. FHR tracing shows moderate variability and accelerations. Prenatal record review shows she was seen in clinic today and complained of vaginal itching, affirm was collected. Specimen dated 4/21/23 shows positivity for candida; pt states she was not notified or treated. Yennifer is orally hydrating at this time    MD verbalized understanding. TORB for Diflucan 150mg now.  TORB for FFN, SVE, UA. No need to repeat wet prep. Okay for oral hydration at this point. Will update with lab results

## 2023-04-22 NOTE — PROVIDER NOTIFICATION
04/22/23 0342   Provider Notification   Provider Name/Title Dr. Alvarez   Method of Notification Phone   MD notified for medication request. Yennifer states she is still feeling tightenings similar in intensity/discomfort as when she presented to triage. Rating uterine activity at 5/10 on pain scale. On TOCO, uterine activity registers every 2-5, frequently 30-40 seconds, palpating mild. Objectively, Yennifer is talking through contractions, not changing position, and not needing to breathe through uterine activity. Discussed with Yennifer that we would like to limit SVE at this time unless active labor or change in condition is suspected.      MD verbalized understanding. TORB for atarax 50 mg; if this dose is unsuccessful, can repeat with additional 50 mg atarax.

## 2023-04-22 NOTE — H&P
Estephanie Haines  [id number]  OB Admit History & Physical      HPI:  Ms. Haines  is a 27 year old  @ 31w6d by LMP c/w first tri US who presented to L&D for   contractions.  She ahs had two termn deliveries.  And no complications this pregnancy .      Prenatal course:  1st visit at 10/31/22 weeks, regular care, TWG 5lbs.    Pregnancy complications:  Asthma    Prenatal labs:  O POS, antibody screen negative,  Rubella Immune, Hep B/HIV/RPR all negative, GC/CT negative, ,  GBS neg; genetic screening tests MT21 normal     OB history:   OB History    Para Term  AB Living   3 2 2 0 0 2   SAB IAB Ectopic Multiple Live Births   0 0 0 0 2      # Outcome Date GA Lbr Obdulio/2nd Weight Sex Delivery Anes PTL Lv   3 Current            2 Term 10/26/20 39w1d 02:25 / 00:15 2.91 kg (6 lb 6.7 oz) M Vag-Spont EPI N AIMEE      Name: SHANNA,MALE-ESTEPHANIE      Apgar1: 9  Apgar5: 9   1 Term 18 38w1d 02:10 / 00:25 2.38 kg (5 lb 4 oz) F Vag-Spont EPI  AIMEE      Name: SHANNABABY1 ESTEPHANIE      Apgar1: 8  Apgar5: 9         PMHx:     Past Medical History:   Diagnosis Date     Depressive disorder      Heroin abuse (H)     Treatment        PSHX:    Past Surgical History:   Procedure Laterality Date     DENTAL SURGERY       ORTHOPEDIC SURGERY Bilateral     Bunions     ORTHOPEDIC SURGERY Bilateral childhood    arm surgery 1 on left and 4x right     wisdom teeth         Meds:    No current outpatient medications on file.       Allergies: Sulfamethoxazole-trimethoprim      REVIEW OF SYSTEMS:  Positives and negatives in HPI.     SocHx:    Social History     Socioeconomic History     Marital status: Single     Spouse name: Not on file     Number of children: Not on file     Years of education: Not on file     Highest education level: Not on file   Occupational History     Not on file   Tobacco Use     Smoking status: Never     Smokeless tobacco: Never   Vaping Use     Vaping status: Not on file   Substance  and Sexual Activity     Alcohol use: No     Comment: Previously rarely     Drug use: No     Comment: previous smoked heroin.  Last use 2/24/15     Sexual activity: Yes     Partners: Male     Birth control/protection: OCP   Other Topics Concern     Parent/sibling w/ CABG, MI or angioplasty before 65F 55M? Not Asked   Social History Narrative     Not on file     Social Determinants of Health     Financial Resource Strain: Not on file   Food Insecurity: Not on file   Transportation Needs: Not on file   Physical Activity: Not on file   Stress: Not on file   Social Connections: Not on file   Intimate Partner Violence: Not on file   Housing Stability: Not on file        Fam Hx:    Family History   Problem Relation Age of Onset     Gastrointestinal Disease Sister         UC     Breast Cancer Maternal Grandmother      Neurologic Disorder Maternal Grandmother         Morrill's      Diabetes Maternal Grandfather          PHYSICAL EXAM:      Vitals:  /60   Temp 98.2  F (36.8  C) (Oral)   Resp 16   Alert Awake in NAD  Pulm: ctab  CV: rrr  ABD: gravid, non-tender, EFW 3lbs  Cervix:  Closed, long, soft  EFM:  Baseline 120, with mod variability, accels to +, no decels  [place]: contractions few now  Results for orders placed or performed during the hospital encounter of 04/21/23 (from the past 24 hour(s))   Fetal fibronectin   Result Value Ref Range    Fetal Fibronectin Positive (A) Negative    FFN Specimen Integrity Satisfactory Specimen    UA with Microscopic reflex to Culture    Specimen: Urine, Clean Catch   Result Value Ref Range    Color Urine Light Yellow Colorless, Straw, Light Yellow, Yellow    Appearance Urine Clear Clear    Glucose Urine Negative Negative mg/dL    Bilirubin Urine Negative Negative    Ketones Urine Negative Negative mg/dL    Specific Gravity Urine 1.013 1.003 - 1.035    Blood Urine Negative Negative    pH Urine 6.5 5.0 - 7.0    Protein Albumin Urine Negative Negative mg/dL    Urobilinogen Urine  Normal Normal, 2.0 mg/dL    Nitrite Urine Negative Negative    Leukocyte Esterase Urine Small (A) Negative    Mucus Urine Present (A) None Seen /LPF    Amorphous Crystals Urine Few (A) None Seen /HPF    RBC Urine 1 <=2 /HPF    WBC Urine 5 <=5 /HPF    Squamous Epithelials Urine 1 <=1 /HPF    Narrative    Urine Culture not indicated      ASSESSMENT/PLAN:  Yennifer Haines  is a 27 year old   At 31w6d by LMP c/w first tri US who presents for  contractions.  1.  Admit to L&D  2.  contractions: FFN +    - start betamethasone course    - nifedipine 20 mg q6 for tocolysis reduced to 10 mg q4 due to SE  3.  Fetal status is Category 1 with accelerations  4.  Pain management: prn  5.  GBS unkown  6. Dispo: when bmz complete or contractions controlled and no cervical change    Justin Alvarez DO  Dept of OB/GYN  2023      Follow up plan: contractions returned when the nifedipine wore off, will stay here till this evening to get the second BMZ then OK to discharge if stable.  Plan for her to go home with nifedipine to cover the rest of the weekend for and max effect of BMZ.  Follow up Monday or Tuesday in clinic.     Justin Alvarez DO   2023  [phone]

## 2023-04-22 NOTE — PROVIDER NOTIFICATION
04/22/23 0143   Provider Notification   Provider Name/Title Dr. Alvarez   Method of Notification Phone     MD phoned unit for update. BMZ, Nifedipine, IV fluid bolus initiated. Contractions have spaced out, currently tracing every 10 minutes. Yennifer is not noticing them as much anymore, but does complain of constant lower back pain. FHR has been tachycardic since 0041; shows moderate variability and accelerations but baseline 170s-180s. MD verbalized understanding, will continue to monitor FHR tracing.

## 2023-04-22 NOTE — CARE PLAN
Patient no longer feeling contractions and none noted on toco. Pt requesting to go home and return tonight for second betamethasone injection. Will update Dr Langford.

## 2023-04-22 NOTE — PLAN OF CARE
During bedside report with NIDHI Louis, Yennifer complained of racing heart rate. BP and pulse assessed. Pulse found to be elevated in 130s. Continuous trace of maternal pulse initiated. This elevated pulse appeared to mirror FHR tracing so US was adjusted until FHR and maternal HR were distinct. Pulse prior to nifedipine administration was WNL. Dr. Alvarez in department and informed of elevated maternal pulse, likely from nifedipine. MD will round on patient shortly.

## 2023-04-22 NOTE — PLAN OF CARE
Review of prenatal record shows patient reported vaginal itching at prenatal visit today; a wet prep was collected and was positive for yeast. During initial triage assessment, Yennifer stated she was unaware of any current infections. Has not received a call from the clinic regarding positivity for candida.

## 2023-04-22 NOTE — CARE PLAN
Pt was having regular mild contractions from 1834-9399. She then got up to urinate and ate breakfast and contractions have resolved.

## 2023-04-22 NOTE — PROVIDER NOTIFICATION
04/22/23 1029   Provider Notification   Provider Name/Title Dr Langford   Method of Notification At Bedside   Notification Reason Status Update     Dr Langford at bedside. Updated re: contractions have stopped, patient feeling better and desires to go home. Orders to discharge to home, script sent, education provided. Pt to come back to hospital tonight around 8-9pm for second betabethasone injection.

## 2023-04-22 NOTE — PLAN OF CARE
Yennifer reports that she was able to fall asleep after atarax administration. Currently denies cramping, back, and contraction pain. TOCO activity has transitioned from regular contraction pattern, to irritability, to no contractions as of 0630. FHR tracing has shown moderate variability and accelerations throughout night. No vaginal bleeding or leaking of fluid noted or reported.

## 2023-04-22 NOTE — CARE PLAN
Data: Patient assessed in the [facility] for uterine contractions. Cervix 0 cm dilated and 20-30% effaced. Fetal station -3. Membranes intact. Contractions are not present at discharge. See flowsheets for fetal assessment documentation.     Action: Presumed adequate fetal oxygenation documented. Discharge instructions reviewed. Patient instructed to report change in fetal movement, vaginal leaking of fluid or bleeding, abdominal pain, or any concerns related to the pregnancy to provider/clinic.      Response: Orders to discharge home per Dr Langford after eval at bedside. Patient verbalized understanding of education and agreement with plan. Discharged to home at 1040.

## 2023-04-22 NOTE — PLAN OF CARE
Yennifer explained to writer in private that she was aware of positivity for yeast; received phone call from clinic and has already taken Diflucan. Will cancel Diflucan order and update MD.

## 2023-04-22 NOTE — PROVIDER NOTIFICATION
04/22/23 0749   Provider Notification   Provider Name/Title Dr Alvarez   Method of Notification At Bedside   Notification Reason Status Update     Dr Alvarez at bedside discussing plan of care with patient. Reviewed EFM tracing. Current uterine irritability, patient does feel this as tightness in abdomen, lower back, into legs. Not as painful as it was upon admission. Maternal pulse continues to be elevated in 130's, likely side effect to nifedipine. SVE per Dr Alvarez closed, thick. Plan to continue to monitor today, will give 10mg nifedipine for next dose at 1215.

## 2023-04-22 NOTE — PROVIDER NOTIFICATION
04/22/23 0003   Provider Notification   Provider Name/Title Dr. Alvarez   Method of Notification At Bedside     MD updated that Yennifer has already taken Diflucan dose today and was aware of yeast infection. Contractions still regular, every 2-4 minutes. Yennifer states that she feels more uncomfortable with contractions as she now has back pain. FHR tracing moderate variability with accelerations. SVE was 0.5 cm at outer os, but writer unable to reach inner os of cervix; 0.5/20-30/-3. No bleeding observed with speculum. Clear/white fluid visualized at base of cervix. FFN result is positive, patient did have sex at 1800 this evening. UA results reviewed.     MD verbalized understanding. TORB for IV fluid bolus, nifedipine 20 mg q 6 hours for tocolysis, and betamethasone.

## 2023-04-22 NOTE — PROGRESS NOTES
Patient now having no ctx and desires discharge home now to return this evening for 2nd betamethasone.  Per Dr Alvarez's plan, will continue nifedipine 10 mg PO q 4 hours (decreased dose due to elevated HR after 20 mg) until seen in clinic Monday or Tuesday.  Should be able to stop at that time.  Recommend pelvic rest and decreased activity until clinic visit as well.  Take pulse prior to dose of nifedipine and call if pulse >110 at rest.  Also call if increased ctx, >6/hour, VB, LOF or decreased FM.

## 2023-04-22 NOTE — PLAN OF CARE
"  Data: Patient presented to [facility]: 2023 10:28 PM.  Reason for maternal/fetal assessment is uterine contractions. Patient reports having sex at 1800, after which uterine contractions began. She rates the contractions at 5/10 on the pain scale and states they feel similar to her previous labors. Contractions every \"couple minutes\". She feels them as vaginal pressure and \"shooting pain\" down her legs.She states that lying down made them better, but they still persist.  Patient is a .  Prenatal record reviewed. Pregnancy  has been complicated by limited weight gain, history of IUGR in previous pregnancies. .  Gestational Age 31w5d. VSS. Fetal movement active. Patient denies leaking of vaginal fluid/rupture of membranes, vaginal bleeding, abdominal pain, pelvic pressure, nausea, vomiting, headache, visual disturbances. Support person is present.   Action: Verbal consent for EFM. Triage assessment completed. Bill of rights reviewed.  Response: Patient verbalized agreement with plan. Will contact Dr Justin Alvarez with update and for further orders.   "

## 2023-04-22 NOTE — DISCHARGE INSTRUCTIONS
Discharge Instruction for Undelivered Patients      You were seen for: Labor Assessment  We Consulted: Dr Alvarez and Dr Langford  You had (Test or Medicine):fetal and uterine monitoring, nifedipine, betamethasone     Diet:   Drink 8 to 12 glasses of liquids (milk, juice, water) every day.  You may eat meals and snacks.     Activity:  Count fetal kicks everyday (see handout)  Call your doctor or nurse midwife if your baby is moving less than usual.     Call your provider if you notice:  Swelling in your face or increased swelling in your hands or legs.  Headaches that are not relieved by Tylenol (acetaminophen).  Changes in your vision (blurring: seeing spots or stars.)  Nausea (sick to your stomach) and vomiting (throwing up).   Weight gain of 5 pounds or more per week.  Heartburn that doesn't go away.  Signs of bladder infection: pain when you urinate (use the toilet), need to go more often and more urgently.  The bag of collins (rupture of membranes) breaks, or you notice leaking in your underwear.  Bright red blood in your underwear.  Abdominal (lower belly) or stomach pain.  For first baby: Contractions (tightening) less than 5 minutes apart for one hour or more.  Second (plus) baby: Contractions (tightening) less than 10 minutes apart and getting stronger.  *If less than 34 weeks: Contractions (tightening) more than 6 times in one hour.  Increase or change in vaginal discharge (note the color and amount)  Other: Call clinic with any questions or concerns.    Check pulse before taking nifedipine. If over 110 at rest, do not take medicine and call Dr Langford.     Follow-up:  Return to Labor and Delivery tonight between 8-9pm for second betamethasone injection.  As scheduled in the clinic.

## 2023-04-23 NOTE — PLAN OF CARE
Pt arrived to [facility] at 2031 for second dose of betamethasone. Pt denies any more contractions and over all feels great. Betamethasone administered and pt discharged home at 2055. Lorin Sellers RN on 4/22/2023 at 8:58 PM

## 2023-05-23 ENCOUNTER — LAB REQUISITION (OUTPATIENT)
Dept: LAB | Facility: CLINIC | Age: 28
End: 2023-05-23
Payer: COMMERCIAL

## 2023-05-23 DIAGNOSIS — Z3A.36 36 WEEKS GESTATION OF PREGNANCY: ICD-10-CM

## 2023-05-23 PROCEDURE — 87653 STREP B DNA AMP PROBE: CPT | Performed by: OBSTETRICS & GYNECOLOGY

## 2023-05-24 LAB — GP B STREP DNA SPEC QL NAA+PROBE: NEGATIVE

## 2023-06-12 ENCOUNTER — HOSPITAL ENCOUNTER (INPATIENT)
Facility: CLINIC | Age: 28
LOS: 2 days | Discharge: HOME | End: 2023-06-14
Attending: OBSTETRICS & GYNECOLOGY | Admitting: OBSTETRICS & GYNECOLOGY
Payer: COMMERCIAL

## 2023-06-12 PROBLEM — Z34.90 PREGNANCY: Status: ACTIVE | Noted: 2020-10-26

## 2023-06-12 LAB
ABO/RH(D): NORMAL
ANTIBODY SCREEN: NEGATIVE
ERYTHROCYTE [DISTWIDTH] IN BLOOD BY AUTOMATED COUNT: 13.4 % (ref 10–15)
HCT VFR BLD AUTO: 30.4 % (ref 35–47)
HGB BLD-MCNC: 10.1 G/DL (ref 11.7–15.7)
MCH RBC QN AUTO: 28.8 PG (ref 26.5–33)
MCHC RBC AUTO-ENTMCNC: 33.2 G/DL (ref 31.5–36.5)
MCV RBC AUTO: 87 FL (ref 78–100)
PLATELET # BLD AUTO: 278 10E3/UL (ref 150–450)
RBC # BLD AUTO: 3.51 10E6/UL (ref 3.8–5.2)
SPECIMEN EXPIRATION DATE: NORMAL
WBC # BLD AUTO: 9.5 10E3/UL (ref 4–11)

## 2023-06-12 PROCEDURE — 86780 TREPONEMA PALLIDUM: CPT | Performed by: OBSTETRICS & GYNECOLOGY

## 2023-06-12 PROCEDURE — 36415 COLL VENOUS BLD VENIPUNCTURE: CPT | Performed by: OBSTETRICS & GYNECOLOGY

## 2023-06-12 PROCEDURE — 86850 RBC ANTIBODY SCREEN: CPT | Performed by: OBSTETRICS & GYNECOLOGY

## 2023-06-12 PROCEDURE — 120N000001 HC R&B MED SURG/OB

## 2023-06-12 PROCEDURE — 250N000013 HC RX MED GY IP 250 OP 250 PS 637: Performed by: OBSTETRICS & GYNECOLOGY

## 2023-06-12 PROCEDURE — 86901 BLOOD TYPING SEROLOGIC RH(D): CPT | Performed by: OBSTETRICS & GYNECOLOGY

## 2023-06-12 PROCEDURE — 85041 AUTOMATED RBC COUNT: CPT | Performed by: OBSTETRICS & GYNECOLOGY

## 2023-06-12 RX ORDER — METHYLERGONOVINE MALEATE 0.2 MG/ML
200 INJECTION INTRAVENOUS
Status: DISCONTINUED | OUTPATIENT
Start: 2023-06-12 | End: 2023-06-13

## 2023-06-12 RX ORDER — OXYTOCIN 10 [USP'U]/ML
10 INJECTION, SOLUTION INTRAMUSCULAR; INTRAVENOUS
Status: DISCONTINUED | OUTPATIENT
Start: 2023-06-12 | End: 2023-06-13

## 2023-06-12 RX ORDER — FENTANYL CITRATE 50 UG/ML
50 INJECTION, SOLUTION INTRAMUSCULAR; INTRAVENOUS EVERY 30 MIN PRN
Status: DISCONTINUED | OUTPATIENT
Start: 2023-06-12 | End: 2023-06-13

## 2023-06-12 RX ORDER — MISOPROSTOL 100 UG/1
25 TABLET ORAL
Status: DISCONTINUED | OUTPATIENT
Start: 2023-06-12 | End: 2023-06-13 | Stop reason: HOSPADM

## 2023-06-12 RX ORDER — ONDANSETRON 4 MG/1
4 TABLET, ORALLY DISINTEGRATING ORAL EVERY 6 HOURS PRN
Status: DISCONTINUED | OUTPATIENT
Start: 2023-06-12 | End: 2023-06-13

## 2023-06-12 RX ORDER — AMOXICILLIN 500 MG/1
500 CAPSULE ORAL 2 TIMES DAILY
Status: ON HOLD | COMMUNITY
End: 2023-06-14

## 2023-06-12 RX ORDER — NALOXONE HYDROCHLORIDE 0.4 MG/ML
0.2 INJECTION, SOLUTION INTRAMUSCULAR; INTRAVENOUS; SUBCUTANEOUS
Status: DISCONTINUED | OUTPATIENT
Start: 2023-06-12 | End: 2023-06-13

## 2023-06-12 RX ORDER — NALOXONE HYDROCHLORIDE 0.4 MG/ML
0.4 INJECTION, SOLUTION INTRAMUSCULAR; INTRAVENOUS; SUBCUTANEOUS
Status: DISCONTINUED | OUTPATIENT
Start: 2023-06-12 | End: 2023-06-13

## 2023-06-12 RX ORDER — CITRIC ACID/SODIUM CITRATE 334-500MG
30 SOLUTION, ORAL ORAL
Status: DISCONTINUED | OUTPATIENT
Start: 2023-06-12 | End: 2023-06-13

## 2023-06-12 RX ORDER — PROCHLORPERAZINE 25 MG
25 SUPPOSITORY, RECTAL RECTAL EVERY 12 HOURS PRN
Status: DISCONTINUED | OUTPATIENT
Start: 2023-06-12 | End: 2023-06-13

## 2023-06-12 RX ORDER — ONDANSETRON 2 MG/ML
4 INJECTION INTRAMUSCULAR; INTRAVENOUS EVERY 6 HOURS PRN
Status: DISCONTINUED | OUTPATIENT
Start: 2023-06-12 | End: 2023-06-13

## 2023-06-12 RX ORDER — OXYTOCIN/0.9 % SODIUM CHLORIDE 30/500 ML
340 PLASTIC BAG, INJECTION (ML) INTRAVENOUS CONTINUOUS PRN
Status: DISCONTINUED | OUTPATIENT
Start: 2023-06-12 | End: 2023-06-13

## 2023-06-12 RX ORDER — MISOPROSTOL 200 UG/1
800 TABLET ORAL
Status: DISCONTINUED | OUTPATIENT
Start: 2023-06-12 | End: 2023-06-13

## 2023-06-12 RX ORDER — IBUPROFEN 400 MG/1
800 TABLET, FILM COATED ORAL
Status: DISCONTINUED | OUTPATIENT
Start: 2023-06-12 | End: 2023-06-13

## 2023-06-12 RX ORDER — KETOROLAC TROMETHAMINE 30 MG/ML
30 INJECTION, SOLUTION INTRAMUSCULAR; INTRAVENOUS
Status: DISCONTINUED | OUTPATIENT
Start: 2023-06-12 | End: 2023-06-13

## 2023-06-12 RX ORDER — METOCLOPRAMIDE 10 MG/1
10 TABLET ORAL EVERY 6 HOURS PRN
Status: DISCONTINUED | OUTPATIENT
Start: 2023-06-12 | End: 2023-06-13

## 2023-06-12 RX ORDER — MISOPROSTOL 200 UG/1
400 TABLET ORAL
Status: DISCONTINUED | OUTPATIENT
Start: 2023-06-12 | End: 2023-06-13

## 2023-06-12 RX ORDER — TERBUTALINE SULFATE 1 MG/ML
0.25 INJECTION, SOLUTION SUBCUTANEOUS
Status: DISCONTINUED | OUTPATIENT
Start: 2023-06-12 | End: 2023-06-13

## 2023-06-12 RX ORDER — PROCHLORPERAZINE MALEATE 5 MG
10 TABLET ORAL EVERY 6 HOURS PRN
Status: DISCONTINUED | OUTPATIENT
Start: 2023-06-12 | End: 2023-06-13

## 2023-06-12 RX ORDER — METOCLOPRAMIDE HYDROCHLORIDE 5 MG/ML
10 INJECTION INTRAMUSCULAR; INTRAVENOUS EVERY 6 HOURS PRN
Status: DISCONTINUED | OUTPATIENT
Start: 2023-06-12 | End: 2023-06-13

## 2023-06-12 RX ORDER — CARBOPROST TROMETHAMINE 250 UG/ML
250 INJECTION, SOLUTION INTRAMUSCULAR
Status: DISCONTINUED | OUTPATIENT
Start: 2023-06-12 | End: 2023-06-13

## 2023-06-12 RX ORDER — TRANEXAMIC ACID 10 MG/ML
1 INJECTION, SOLUTION INTRAVENOUS EVERY 30 MIN PRN
Status: DISCONTINUED | OUTPATIENT
Start: 2023-06-12 | End: 2023-06-13

## 2023-06-12 RX ORDER — OXYTOCIN/0.9 % SODIUM CHLORIDE 30/500 ML
100-340 PLASTIC BAG, INJECTION (ML) INTRAVENOUS CONTINUOUS PRN
Status: DISCONTINUED | OUTPATIENT
Start: 2023-06-12 | End: 2023-06-13

## 2023-06-12 RX ORDER — HYDROXYZINE HYDROCHLORIDE 25 MG/1
50 TABLET, FILM COATED ORAL
Status: DISCONTINUED | OUTPATIENT
Start: 2023-06-12 | End: 2023-06-13 | Stop reason: HOSPADM

## 2023-06-12 RX ADMIN — MISOPROSTOL 25 MCG: 100 TABLET ORAL at 21:19

## 2023-06-12 RX ADMIN — MISOPROSTOL 25 MCG: 100 TABLET ORAL at 23:22

## 2023-06-12 RX ADMIN — HYDROXYZINE HYDROCHLORIDE 50 MG: 25 TABLET, FILM COATED ORAL at 23:22

## 2023-06-12 ASSESSMENT — ACTIVITIES OF DAILY LIVING (ADL)
DOING_ERRANDS_INDEPENDENTLY_DIFFICULTY: NO
ADLS_ACUITY_SCORE: 37
CONCENTRATING,_REMEMBERING_OR_MAKING_DECISIONS_DIFFICULTY: NO
WEAR_GLASSES_OR_BLIND: NO
CHANGE_IN_FUNCTIONAL_STATUS_SINCE_ONSET_OF_CURRENT_ILLNESS/INJURY: NO
WALKING_OR_CLIMBING_STAIRS_DIFFICULTY: NO
DRESSING/BATHING_DIFFICULTY: NO
FALL_HISTORY_WITHIN_LAST_SIX_MONTHS: NO
DIFFICULTY_EATING/SWALLOWING: NO
TOILETING_ISSUES: NO
ADLS_ACUITY_SCORE: 20

## 2023-06-13 ENCOUNTER — ANESTHESIA EVENT (OUTPATIENT)
Dept: OBGYN | Facility: CLINIC | Age: 28
End: 2023-06-13
Payer: COMMERCIAL

## 2023-06-13 ENCOUNTER — ANESTHESIA (OUTPATIENT)
Dept: OBGYN | Facility: CLINIC | Age: 28
End: 2023-06-13
Payer: COMMERCIAL

## 2023-06-13 LAB — T PALLIDUM AB SER QL: NONREACTIVE

## 2023-06-13 PROCEDURE — 250N000011 HC RX IP 250 OP 636: Performed by: ANESTHESIOLOGY

## 2023-06-13 PROCEDURE — 10907ZC DRAINAGE OF AMNIOTIC FLUID, THERAPEUTIC FROM PRODUCTS OF CONCEPTION, VIA NATURAL OR ARTIFICIAL OPENING: ICD-10-PCS | Performed by: OBSTETRICS & GYNECOLOGY

## 2023-06-13 PROCEDURE — 120N000012 HC R&B POSTPARTUM

## 2023-06-13 PROCEDURE — 250N000013 HC RX MED GY IP 250 OP 250 PS 637: Performed by: OBSTETRICS & GYNECOLOGY

## 2023-06-13 PROCEDURE — 722N000001 HC LABOR CARE VAGINAL DELIVERY SINGLE

## 2023-06-13 PROCEDURE — 370N000003 HC ANESTHESIA WARD SERVICE: Performed by: ANESTHESIOLOGY

## 2023-06-13 PROCEDURE — 258N000003 HC RX IP 258 OP 636: Performed by: OBSTETRICS & GYNECOLOGY

## 2023-06-13 PROCEDURE — 00HU33Z INSERTION OF INFUSION DEVICE INTO SPINAL CANAL, PERCUTANEOUS APPROACH: ICD-10-PCS | Performed by: ANESTHESIOLOGY

## 2023-06-13 PROCEDURE — 3E0R3BZ INTRODUCTION OF ANESTHETIC AGENT INTO SPINAL CANAL, PERCUTANEOUS APPROACH: ICD-10-PCS | Performed by: ANESTHESIOLOGY

## 2023-06-13 PROCEDURE — 250N000009 HC RX 250: Performed by: OBSTETRICS & GYNECOLOGY

## 2023-06-13 RX ORDER — ROPIVACAINE HYDROCHLORIDE 2 MG/ML
INJECTION, SOLUTION EPIDURAL; INFILTRATION; PERINEURAL
Status: COMPLETED | OUTPATIENT
Start: 2023-06-13 | End: 2023-06-13

## 2023-06-13 RX ORDER — MISOPROSTOL 200 UG/1
400 TABLET ORAL
Status: DISCONTINUED | OUTPATIENT
Start: 2023-06-13 | End: 2023-06-14 | Stop reason: HOSPADM

## 2023-06-13 RX ORDER — TRANEXAMIC ACID 10 MG/ML
1 INJECTION, SOLUTION INTRAVENOUS EVERY 30 MIN PRN
Status: DISCONTINUED | OUTPATIENT
Start: 2023-06-13 | End: 2023-06-14 | Stop reason: HOSPADM

## 2023-06-13 RX ORDER — IBUPROFEN 400 MG/1
800 TABLET, FILM COATED ORAL EVERY 6 HOURS PRN
Status: DISCONTINUED | OUTPATIENT
Start: 2023-06-13 | End: 2023-06-14 | Stop reason: HOSPADM

## 2023-06-13 RX ORDER — OXYTOCIN/0.9 % SODIUM CHLORIDE 30/500 ML
1-24 PLASTIC BAG, INJECTION (ML) INTRAVENOUS CONTINUOUS
Status: DISCONTINUED | OUTPATIENT
Start: 2023-06-13 | End: 2023-06-13 | Stop reason: HOSPADM

## 2023-06-13 RX ORDER — DIPHENHYDRAMINE HCL 25 MG
25 CAPSULE ORAL EVERY 6 HOURS PRN
Status: DISCONTINUED | OUTPATIENT
Start: 2023-06-13 | End: 2023-06-14 | Stop reason: HOSPADM

## 2023-06-13 RX ORDER — DIPHENHYDRAMINE HYDROCHLORIDE 50 MG/ML
25 INJECTION INTRAMUSCULAR; INTRAVENOUS EVERY 6 HOURS PRN
Status: DISCONTINUED | OUTPATIENT
Start: 2023-06-13 | End: 2023-06-14 | Stop reason: HOSPADM

## 2023-06-13 RX ORDER — ONDANSETRON 2 MG/ML
4 INJECTION INTRAMUSCULAR; INTRAVENOUS EVERY 6 HOURS PRN
Status: DISCONTINUED | OUTPATIENT
Start: 2023-06-13 | End: 2023-06-14 | Stop reason: HOSPADM

## 2023-06-13 RX ORDER — TERBUTALINE SULFATE 1 MG/ML
0.25 INJECTION, SOLUTION SUBCUTANEOUS
Status: DISCONTINUED | OUTPATIENT
Start: 2023-06-13 | End: 2023-06-13 | Stop reason: HOSPADM

## 2023-06-13 RX ORDER — MISOPROSTOL 200 UG/1
800 TABLET ORAL
Status: DISCONTINUED | OUTPATIENT
Start: 2023-06-13 | End: 2023-06-14 | Stop reason: HOSPADM

## 2023-06-13 RX ORDER — SODIUM CHLORIDE, SODIUM LACTATE, POTASSIUM CHLORIDE, CALCIUM CHLORIDE 600; 310; 30; 20 MG/100ML; MG/100ML; MG/100ML; MG/100ML
INJECTION, SOLUTION INTRAVENOUS CONTINUOUS PRN
Status: DISCONTINUED | OUTPATIENT
Start: 2023-06-13 | End: 2023-06-13 | Stop reason: HOSPADM

## 2023-06-13 RX ORDER — OXYTOCIN/0.9 % SODIUM CHLORIDE 30/500 ML
340 PLASTIC BAG, INJECTION (ML) INTRAVENOUS CONTINUOUS PRN
Status: DISCONTINUED | OUTPATIENT
Start: 2023-06-13 | End: 2023-06-14 | Stop reason: HOSPADM

## 2023-06-13 RX ORDER — BISACODYL 10 MG
10 SUPPOSITORY, RECTAL RECTAL DAILY PRN
Status: DISCONTINUED | OUTPATIENT
Start: 2023-06-13 | End: 2023-06-14 | Stop reason: HOSPADM

## 2023-06-13 RX ORDER — PRENATAL VIT/IRON FUM/FOLIC AC 27MG-0.8MG
1 TABLET ORAL DAILY
Status: CANCELLED | OUTPATIENT
Start: 2023-06-14

## 2023-06-13 RX ORDER — OXYTOCIN 10 [USP'U]/ML
10 INJECTION, SOLUTION INTRAMUSCULAR; INTRAVENOUS
Status: DISCONTINUED | OUTPATIENT
Start: 2023-06-13 | End: 2023-06-14 | Stop reason: HOSPADM

## 2023-06-13 RX ORDER — FENTANYL CITRATE 50 UG/ML
INJECTION, SOLUTION INTRAMUSCULAR; INTRAVENOUS
Status: COMPLETED | OUTPATIENT
Start: 2023-06-13 | End: 2023-06-13

## 2023-06-13 RX ORDER — FENTANYL CITRATE 50 UG/ML
100 INJECTION, SOLUTION INTRAMUSCULAR; INTRAVENOUS ONCE
Status: DISCONTINUED | OUTPATIENT
Start: 2023-06-13 | End: 2023-06-14 | Stop reason: HOSPADM

## 2023-06-13 RX ORDER — HYDROCORTISONE 25 MG/G
CREAM TOPICAL 3 TIMES DAILY PRN
Status: DISCONTINUED | OUTPATIENT
Start: 2023-06-13 | End: 2023-06-14 | Stop reason: HOSPADM

## 2023-06-13 RX ORDER — CARBOPROST TROMETHAMINE 250 UG/ML
250 INJECTION, SOLUTION INTRAMUSCULAR
Status: DISCONTINUED | OUTPATIENT
Start: 2023-06-13 | End: 2023-06-14 | Stop reason: HOSPADM

## 2023-06-13 RX ORDER — EPHEDRINE SULFATE 50 MG/ML
5 INJECTION, SOLUTION INTRAMUSCULAR; INTRAVENOUS; SUBCUTANEOUS
Status: DISCONTINUED | OUTPATIENT
Start: 2023-06-13 | End: 2023-06-14 | Stop reason: HOSPADM

## 2023-06-13 RX ORDER — ONDANSETRON 4 MG/1
4 TABLET, ORALLY DISINTEGRATING ORAL EVERY 6 HOURS PRN
Status: DISCONTINUED | OUTPATIENT
Start: 2023-06-13 | End: 2023-06-14 | Stop reason: HOSPADM

## 2023-06-13 RX ORDER — DOCUSATE SODIUM 100 MG/1
100 CAPSULE, LIQUID FILLED ORAL DAILY
Status: DISCONTINUED | OUTPATIENT
Start: 2023-06-13 | End: 2023-06-14 | Stop reason: HOSPADM

## 2023-06-13 RX ORDER — ROPIVACAINE HYDROCHLORIDE 2 MG/ML
10 INJECTION, SOLUTION EPIDURAL; INFILTRATION; PERINEURAL ONCE
Status: DISCONTINUED | OUTPATIENT
Start: 2023-06-13 | End: 2023-06-14 | Stop reason: HOSPADM

## 2023-06-13 RX ORDER — ACETAMINOPHEN 325 MG/1
650 TABLET ORAL EVERY 4 HOURS PRN
Status: DISCONTINUED | OUTPATIENT
Start: 2023-06-13 | End: 2023-06-14 | Stop reason: HOSPADM

## 2023-06-13 RX ORDER — METHYLERGONOVINE MALEATE 0.2 MG/ML
200 INJECTION INTRAVENOUS
Status: DISCONTINUED | OUTPATIENT
Start: 2023-06-13 | End: 2023-06-14 | Stop reason: HOSPADM

## 2023-06-13 RX ORDER — MODIFIED LANOLIN
OINTMENT (GRAM) TOPICAL
Status: DISCONTINUED | OUTPATIENT
Start: 2023-06-13 | End: 2023-06-14 | Stop reason: HOSPADM

## 2023-06-13 RX ADMIN — Medication 2 MILLI-UNITS/MIN: at 11:55

## 2023-06-13 RX ADMIN — MISOPROSTOL 25 MCG: 100 TABLET ORAL at 01:14

## 2023-06-13 RX ADMIN — FENTANYL CITRATE 100 MCG: 50 INJECTION, SOLUTION INTRAMUSCULAR; INTRAVENOUS at 10:45

## 2023-06-13 RX ADMIN — Medication: at 10:33

## 2023-06-13 RX ADMIN — IBUPROFEN 800 MG: 400 TABLET ORAL at 22:18

## 2023-06-13 RX ADMIN — SODIUM CHLORIDE, POTASSIUM CHLORIDE, SODIUM LACTATE AND CALCIUM CHLORIDE 1000 ML: 600; 310; 30; 20 INJECTION, SOLUTION INTRAVENOUS at 10:04

## 2023-06-13 RX ADMIN — IBUPROFEN 800 MG: 400 TABLET ORAL at 16:03

## 2023-06-13 RX ADMIN — MISOPROSTOL 25 MCG: 100 TABLET ORAL at 03:24

## 2023-06-13 RX ADMIN — ROPIVACAINE HYDROCHLORIDE 8 ML: 2 INJECTION, SOLUTION EPIDURAL; INFILTRATION at 10:45

## 2023-06-13 RX ADMIN — ACETAMINOPHEN 650 MG: 325 TABLET ORAL at 16:03

## 2023-06-13 RX ADMIN — ACETAMINOPHEN 650 MG: 325 TABLET ORAL at 22:17

## 2023-06-13 RX ADMIN — DOCUSATE SODIUM 100 MG: 100 CAPSULE, LIQUID FILLED ORAL at 16:03

## 2023-06-13 ASSESSMENT — ACTIVITIES OF DAILY LIVING (ADL)
ADLS_ACUITY_SCORE: 20

## 2023-06-13 NOTE — PROVIDER NOTIFICATION
06/13/23 1000 06/13/23 1031   Epidural Placement Care   Epidural Request/Placement epidural requested anesthesiologist at bedside;patient positioned;time out performed

## 2023-06-13 NOTE — H&P
OB Brief Admit H&P    No significant change in general health status based on examination of the patient, review of Nursing Admission Database and prenatal record.    Pt is a 28 year old  @ 39w1d who presented to L&D with IOL for FGR.    Patient's prenatal course has been complicated by   1.FGR, normal doppler  2.mild asthma  3.BMI 18 at beginning of pregnancy, low maternal weight gain  4.history of FGR   5.PTL at 32 weeks, got course of betamethasone and steroid course     Prenatal Labs:    Blood type O+  Hep Bs Ag neg  HIV neg  RPR NR  Hep C antibody negative  Rubella immune  GCT passed   GBS negative   S/p tdap and flu shot  Has not had bivalent booster     EFW: 6# (10%ile) , AC 9.8%    There were no vitals taken for this visit.  EFM:  120 +accels, no decel, mod variability   [place]: quiet   SVE:  per RN  Membranes:  intact    Assessment:  28 year old  @ 39w1d admitted for IOL for FGR    Plan:  1. Admit to labor and delivery   2. Po cytotec, category 1 tracing  3. Pain mgmt per patient desire   4. vistaryl prn tonight     Mary Thomas MD  2023  8:53 PM

## 2023-06-13 NOTE — ANESTHESIA PREPROCEDURE EVALUATION
Anesthesia Pre-Procedure Evaluation    Patient: Yennifer Parisi   MRN: [id number] : 1995        Procedure :           Past Medical History:   Diagnosis Date     Depressive disorder      Heroin abuse (H)     Treatment       Past Surgical History:   Procedure Laterality Date     DENTAL SURGERY       ORTHOPEDIC SURGERY Bilateral     Bunions     ORTHOPEDIC SURGERY Bilateral childhood    arm surgery 1 on left and 4x right     wisdom teeth        Allergies   Allergen Reactions     Sulfamethoxazole-Trimethoprim Rash      Social History     Tobacco Use     Smoking status: Never     Smokeless tobacco: Never   Vaping Use     Vaping status: Not on file   Substance Use Topics     Alcohol use: No     Comment: Previously rarely      Wt Readings from Last 1 Encounters:   10/25/20 59 kg (130 lb)        Anesthesia Evaluation   Pt has had prior anesthetic.         ROS/MED HX  ENT/Pulmonary:    (-) asthma   Neurologic:  - neg neurologic ROS     Cardiovascular:    (-) PIH   METS/Exercise Tolerance:     Hematologic:     (+) no anticoagulation therapy, no coagulopathy,     Musculoskeletal:       GI/Hepatic:     (+) GERD,     Renal/Genitourinary:       Endo:       Psychiatric/Substance Use:     (+) psychiatric history depression Recreational drug usage: Other (Comment).    Infectious Disease:       Malignancy:       Other:            Physical Exam    Airway        Mallampati: II   TM distance: > 3 FB   Neck ROM: full     Respiratory Devices and Support         Dental  no notable dental history         Cardiovascular   cardiovascular exam normal          Pulmonary   pulmonary exam normal                OUTSIDE LABS:  CBC:   Lab Results   Component Value Date    WBC 9.5 2023    WBC 8.1 10/31/2022    HGB 10.1 (L) 2023    HGB 11.3 (L) 2023    HCT 30.4 (L) 2023    HCT 38.9 10/31/2022     2023     10/31/2022     BMP:   Lab Results   Component Value Date     2013     POTASSIUM 3.8 03/20/2013    CHLORIDE 104 03/20/2013    CO2 25 03/20/2013    BUN 9 03/20/2013    CR 0.63 03/20/2013    GLC 71 03/20/2013     COAGS: No results found for: PTT, INR, FIBR  POC:   Lab Results   Component Value Date    HCG NEGATIVE 04/16/2015     HEPATIC:   Lab Results   Component Value Date    ALBUMIN 4.4 03/20/2013    PROTTOTAL 7.5 03/20/2013    ALT 27 03/20/2013    AST 24 03/20/2013    ALKPHOS 87 03/20/2013    BILITOTAL 0.2 03/20/2013     OTHER:   Lab Results   Component Value Date    QUANG 9.2 03/20/2013       Anesthesia Plan    ASA Status:  2      Anesthesia Type: Epidural.              Consents    Anesthesia Plan(s) and associated risks, benefits, and realistic alternatives discussed. Questions answered and patient/representative(s) expressed understanding.    - Discussed:     - Discussed with:  Patient         Postoperative Care            Comments:    Other Comments: Orders to manage the epidural infusion have been entered, and through coordination with the nurse, we will continute to manage and monitor the patient's labor epidural.  We will continuously be available to adjust as needed thruout the entire L&D process.             Gaurav Newman MD

## 2023-06-13 NOTE — ANESTHESIA PROCEDURE NOTES
"Epidural catheter Procedure Note    Pre-Procedure   Staff -        Anesthesiologist:  Gaurav Newman MD       Performed By: anesthesiologist       Location: OB       Pre-Anesthestic Checklist: patient identified, IV checked, risks and benefits discussed, informed consent, monitors and equipment checked, pre-op evaluation and at physician/surgeon's request  Timeout:       Correct Patient: Yes        Correct Procedure: Yes        Correct Site: Yes        Correct Position: Yes   Procedure Documentation  Procedure: epidural catheter       Diagnosis: Labor Pain       Patient Position: sitting       Patient Prep/Sterile Barriers: sterile gloves, mask, patient draped, x3       Skin prep: Betadine       Local skin infiltrated with mL of 1% lidocaine.        Insertion Site: L3-4. (midline approach).       Technique: LORT saline        WANDA at 5 cm.       Needle Type: [id number] Discoveriesy needle       Needle Gauge: 17.        Needle Length (Inches): 5        Catheter: 19 G.          Catheter threaded easily.         3 cm epidural space.         Threaded 9 cm at skin.         # of attempts: 1 and  # of redirects:     Assessment/Narrative         Paresthesias: No.       Test dose of 3 mL lidocaine 1.5% w/ 1:200,000 epinephrine at 10:40 CDT.         Test dose negative, 3 minutes after injection, for signs of intravascular, subdural, or intrathecal injection.       Insertion/Infusion Method: LORT saline       Aspiration negative for Heme or CSF via Epidural Catheter.    Medication(s) Administered   0.2% Ropivacaine (Epidural) - EPIDURAL   8 mL - 6/13/2023 10:45:00 AM  Fentanyl PF (Epidural) - EPIDURAL   100 mcg - 6/13/2023 10:45:00 AM   Comments:  Catheter secured with adhesive spray, tegaderm, and tape.      FOR [facility] ([facility]) ONLY:   Pain Team Contact information: please page the Pain Team Via [employer]. Search \"Pain\". During daytime hours, please page the attending first. At night please page the resident first.      "

## 2023-06-13 NOTE — PROGRESS NOTES
OB Progress Note  2023  8:56 AM    S:  Pt feeling contractions, becoming more uncomfortable.    O:  /78   Temp 97.5  F (36.4  C) (Temporal)   Resp 18   EFM: baseline 125, accelerations present, no decelerations, moderate variability  [place]:  Ctx q2-3 min  SVE:  2/70/-2  Membranes: AROM @ 0855, clear fluid    A/P:  28 year old  @39w2d admitted for IOL for FGR.    Labor: s/p PO cytotec  - s/p AROM, clear fluid. Continue to monitor progress, ok for pitocin augmentation if needed  - Pain: planning epidural    FWB: Category I, reactive  - continuous monitoring    Prenatal:  - GBS negative  - Rh pos, rubella immune    Rose Scherer MD  2023

## 2023-06-13 NOTE — L&D DELIVERY NOTE
OB Vaginal Delivery Note    Yennifer Parisi MRN# [id number]   Age: 28 year old YOB: 1995       GA: 39w2d  GP:   Labor Complications:    EBL: 50 mL  Delivery QBL:    Delivery Type: Vaginal, Spontaneous   ROM to Delivery Time: (Delivered) Hours: 5 Minutes: 53   Weight: 3.02 kg (6 lb 10.5 oz)    1 Minute 5 Minute 10 Minute   Apgar Totals: 8   9        JAME MOJICA;MATA ROCHA     Delivery Details:  HISTORY OF PRESENT ILLNESS:   with IUP @ 39w1d who presented for induction of labor due to FGR.  Her prenatal course was complicated by fetal growth restriction with last EFW 10%ile and normal dopplers, mild asthma, low maternal weight gain, history of FGR,  contractions at 32 wks s/p BMZ steroid course.  Prenatal labs were significant for blood type O positive, rubella status immune.  Glucose challenge test passed.  Group beta strep culture was negative.  Estimated fetal weight on admission was approximately 6 lbs.        FIRST STAGE OF LABOR:  The patient was admitted to Labor and Delivery with a fetal heart rate tracing that showed  bpm, moderate variability, accels present, no decels, moderate variability. Cervix was noted to be 1/60 on 23.  She received 4 doses of PO cytotec for cervical ripening. AROM was then performed for clear fluid at 0850 on 23.  She received an epidural.  She was started on IV pitocin for augmentation. She reached complete dilation at 1430 on 23.       SECOND STAGE OF LABOR:  The patient began pushing at 1437.  She gradually brought the vertex down in the birth canal and delivered a viable female infant at 1443.  After delivery of the head, the anterior shoulder and body delivered without difficulty.  There was no nuchal cord noted. The infant was placed on the mother's chest.  Delayed cord clamping was performed.        THIRD STAGE OF LABOR:  The cord was clamped and cut. The placenta then delivered  spontaneously and appeared intact with a 3-vessel cord at 1449.  Pitocin was started and fundal massage was performed.  Perineum was inspected and there were small hemostatic abrasions noted there as well as bilaterally periurethrally. All areas were hemostatic and did not require repair. Estimated blood loss for the delivery was 50 mL.       Both mother and baby tolerated delivery well and there were no complications. Fetal weight was 6 lbs 10.5 oz (3020g) and Apgars were 8 and 9 at 1 and 5 minutes, respectively.       Rose Limon MD  2023  3:00 PM          Thor Parisi [[id number]]    Labor Event Times    Latent labor onset date/time: 2023 1300    Active labor onset date: 23 Onset time:  1:00 PM CDT   Dilation complete date: 23 Complete time:  2:30 PM   Start pushing date/time: 2023 1437      Labor Events     labor?: No   steroids: Full Course  Labor Type: Induction/Cervical ripening  Predominate monitoring during 1st stage: continuous electronic fetal monitoring     Rupture date/time: 23 0850   Rupture type: Artificial Rupture of Membranes  Fluid color: Clear     Induction: Misoprostol, AROM, Oxytocin  Induction date/time:      Cervical ripening date/time: 239    Indications for induction: Fetal Indications or Congenital Malformations (Comment to specify)     Augmentation: Oxytocin, AROM     Delivery/Placenta Date and Time    Delivery Date: 23 Delivery Time:  2:43 PM   Placenta Date/Time: 2023  2:49 PM  Oxytocin given at the time of delivery: after delivery of baby  Delivering clinician: Rose Limon MD   Other personnel present at delivery:  Provider Role   Kayla Reynolds RN Amundson, Denise L RN          Vaginal Counts     Initial count performed by 2 team members:  Two Team Members   Verónica limon       Needles Suture Needles Sponges (RETIRED) Instruments   Initial counts 2  5    Added to count       Relief  "counts       Final counts 2  5          Placed during labor Accounted for at the end of labor   FSE NA NA   IUPC NA NA   Cervidil NA NA              Final count performed by 2 team members:  Two Team Members   radha scherer      Final count correct?: Yes  Pre-Birth Team Brief: Complete  Post-Birth Team Debrief: Complete     Apgars    Living status: Living   1 Minute 5 Minute 10 Minute 15 Minute 20 Minute   Skin color: 0  1       Heart rate: 2  2       Reflex irritability: 2  2       Muscle tone: 2  2       Respiratory effort: 2  2       Total: 8  9       Apgars assigned by: KAYLA REYNOLDS RN     Cord    Vessels: 3 Vessels    Cord Complications: None   Cord Blood Disposition: Lab      Gases Sent?: No      Delayed cord clamping?: Yes      Cord Clamping Delay (seconds):  seconds      Stem cell collection?: No                     [place] Resuscitation    Methods: None     [place] Measurements    Weight: 6 lb 10.5 oz Length: 1' 8.5\"   Head circumference: 33 cm       Skin to Skin and Feeding Plan    Skin to skin initiation date/time: 1841    Skin to skin with: Mother  Skin to skin end date/time:        Labor Events and Shoulder Dystocia    Fetal Tracing Prior to Delivery: Category 2  Shoulder dystocia present?: Neg     Delivery (Maternal) (Provider to Complete) ([id number])    Perineal lacerations: None    Periurethral laceration: bilateral Repaired?: No   Repair suture: None  Genital tract inspection done: Pos     Blood Loss  Mother: Yennifer Parisi #[id number]   Start of Mother's Information    Delivery Blood Loss  23 1300 - 23 1638    None           End of Mother's Information  Mother: Yennifer Parisi #[id number]          Delivery - Provider to Complete ([id number])    Delivering clinician: Rose Scherer MD  Delivery Type (Choose the 1 that will go to the Birth History): Vaginal, Spontaneous    Other personnel:  Provider Role   Kayla Reynolds RN Amundson, Denise L, RN  "                               Placenta    Date/Time: 6/13/2023  2:49 PM  Removal: Spontaneous  Disposition: Hospital disposal           Anesthesia    Method: Epidural                Presentation and Position    Presentation: Vertex    Position: Left Occiput Anterior                 Rose Scherer MD

## 2023-06-13 NOTE — PLAN OF CARE
Goal Outcome Evaluation:        Data: Yennifer Parisi transferred to [facility] via wheelchair at 1735. Baby transferred via parent's arms.  Action: Receiving unit notified of transfer: Yes. Patient and family notified of room change. Report given to Alisson PUTNAM at 1740. Belongings sent to receiving unit. Accompanied by Registered Nurse. Oriented patient to surroundings. Call light within reach. ID bands double-checked with receiving RN.  Response: Patient tolerated transfer and is stable.

## 2023-06-13 NOTE — PROVIDER NOTIFICATION
06/13/23 0548   Provider Notification   Provider Name/Title Dr Thomas   Method of Notification In Department   Request Evaluate - Remote   Notification Reason Uterine Activity;Pain;SVE;Status Update     Provider notified that Patient's Cunha Score is 6. PO Cytotec not given. SVE in 2 hours, if unchanged, may augment labor with Pitocin.

## 2023-06-13 NOTE — PROVIDER NOTIFICATION
06/13/23 0850   Provider Notification   Provider Name/Title [facility]mer   Method of Notification At Bedside   Notification Reason SVE;Status Update;Uterine Activity;Pain;Labor Status;Membrane Status

## 2023-06-13 NOTE — PLAN OF CARE
Goal Outcome Evaluation:       Patient arrived to the unit with baby in arms and  by her side. They were oriented to the room and safety education was completed. Both verbalized understanding. Vital signs stable. Able to ambulate in room free of dizziness. Taking tylenol/ibuprofen for pain management. Working on breastfeeding infant every 2-3 hours. Encouraged to call with questions/concerns. Will continue to monitor.

## 2023-06-14 VITALS
OXYGEN SATURATION: 100 % | SYSTOLIC BLOOD PRESSURE: 116 MMHG | HEART RATE: 76 BPM | DIASTOLIC BLOOD PRESSURE: 69 MMHG | TEMPERATURE: 97.9 F | RESPIRATION RATE: 16 BRPM | BODY MASS INDEX: 20.79 KG/M2 | WEIGHT: 121.1 LBS

## 2023-06-14 LAB — HGB BLD-MCNC: 9.3 G/DL (ref 11.7–15.7)

## 2023-06-14 PROCEDURE — 250N000013 HC RX MED GY IP 250 OP 250 PS 637: Performed by: OBSTETRICS & GYNECOLOGY

## 2023-06-14 PROCEDURE — 85018 HEMOGLOBIN: CPT | Performed by: OBSTETRICS & GYNECOLOGY

## 2023-06-14 PROCEDURE — 36415 COLL VENOUS BLD VENIPUNCTURE: CPT | Performed by: OBSTETRICS & GYNECOLOGY

## 2023-06-14 RX ORDER — DOCUSATE SODIUM 100 MG/1
100 CAPSULE, LIQUID FILLED ORAL DAILY
Qty: 60 CAPSULE | Refills: 0 | Status: SHIPPED | OUTPATIENT
Start: 2023-06-14

## 2023-06-14 RX ORDER — NALOXONE HYDROCHLORIDE 0.4 MG/ML
0.2 INJECTION, SOLUTION INTRAMUSCULAR; INTRAVENOUS; SUBCUTANEOUS
Status: DISCONTINUED | OUTPATIENT
Start: 2023-06-14 | End: 2023-06-14 | Stop reason: HOSPADM

## 2023-06-14 RX ORDER — NALOXONE HYDROCHLORIDE 0.4 MG/ML
0.4 INJECTION, SOLUTION INTRAMUSCULAR; INTRAVENOUS; SUBCUTANEOUS
Status: DISCONTINUED | OUTPATIENT
Start: 2023-06-14 | End: 2023-06-14 | Stop reason: HOSPADM

## 2023-06-14 RX ORDER — BREAST PUMP
1 EACH MISCELLANEOUS ONCE
Qty: 1 EACH | Refills: 0 | Status: SHIPPED | OUTPATIENT
Start: 2023-06-14 | End: 2023-06-14

## 2023-06-14 RX ORDER — ACETAMINOPHEN 325 MG/1
650 TABLET ORAL EVERY 4 HOURS PRN
Qty: 90 TABLET | Refills: 0 | Status: SHIPPED | OUTPATIENT
Start: 2023-06-14

## 2023-06-14 RX ORDER — IBUPROFEN 800 MG/1
800 TABLET, FILM COATED ORAL EVERY 6 HOURS PRN
Qty: 90 TABLET | Refills: 0 | Status: SHIPPED | OUTPATIENT
Start: 2023-06-14

## 2023-06-14 RX ADMIN — IBUPROFEN 800 MG: 400 TABLET ORAL at 11:22

## 2023-06-14 RX ADMIN — ACETAMINOPHEN 650 MG: 325 TABLET ORAL at 11:22

## 2023-06-14 RX ADMIN — IBUPROFEN 800 MG: 400 TABLET ORAL at 05:21

## 2023-06-14 RX ADMIN — ACETAMINOPHEN 650 MG: 325 TABLET ORAL at 05:21

## 2023-06-14 RX ADMIN — DOCUSATE SODIUM 100 MG: 100 CAPSULE, LIQUID FILLED ORAL at 09:49

## 2023-06-14 ASSESSMENT — ACTIVITIES OF DAILY LIVING (ADL)
ADLS_ACUITY_SCORE: 20

## 2023-06-14 NOTE — DISCHARGE INSTRUCTIONS
Postpartum Vaginal Delivery Instructions    Activity     Ask family and friends for help when you need it.  Do not place anything in your vagina for 6 weeks.  You are not restricted on other activities, but take it easy for a few weeks to allow your body to recover from delivery.  You are able to do any activities you feel up to that point.  No driving until you have stopped taking your pain medications (usually two weeks after delivery).     Call your health care provider if you have any of these symptoms:     Increased pain, swelling, redness, or fluid around your stiches from an episiotomy or perineal tear.  A fever above 100.4 F (38 C) with or without chills when placing a thermometer under your tongue.  You soak a sanitary pad with blood within 1 hour, or you see blood clots larger than a golf ball.  Bleeding that lasts more than 6 weeks.  Vaginal discharge that smells bad.  Severe pain, cramping or tenderness in your lower belly area.  A need to urinate more frequently (use the toilet more often), more urgently (use the toilet very quickly), or it burns when you urinate.  Nausea and vomiting.  Redness, swelling or pain around a vein in your leg.  Problems breastfeeding or a red or painful area on your breast.  Chest pain and cough or are gasping for air.  Problems coping with sadness, anxiety, or depression.  If you have any concerns about hurting yourself or the baby, call your provider immediately.   You have questions or concerns after you return home.     Keep your hands clean:  Always wash your hands before touching your perineal area and stitches.  This helps reduce your risk of infection.  If your hands aren't dirty, you may use an alcohol hand-rub to clean your hands. Keep your nails clean and short.        Warning Signs after Having a Baby    Keep this paper on your fridge or somewhere else where you can see it.    Call your provider if you have any of these symptoms up to 12 weeks after having your  baby.    Thoughts of hurting yourself or your baby  Pain in your chest or trouble breathing  Severe headache not helped by pain medicine  Eyesight concerns (blurry vision, seeing spots or flashes of light, other changes to eyesight)  Fainting, shaking or other signs of a seizure    Call 9-1-1 if you feel that it is an emergency.     The symptoms below can happen to anyone after giving birth. They can be very serious. Call your provider if you have any of these warning signs.    My provider s phone number: _______________________    Losing too much blood (hemorrhage)    Call your provider if you soak through a pad in less than an hour or pass blood clots bigger than a golf ball. These may be signs that you are bleeding too much.    Blood clots in the legs or lungs    After you give birth, your body naturally clots its blood to help prevent blood loss. Sometimes this increased clotting can happen in other areas of the body, like the legs or lungs. This can block your blood flow and be very dangerous.     Call your provider if you:  Have a red, swollen spot on the back of your leg that is warm or painful when you touch it.   Are coughing up blood.     Infection    Call your provider if you have any of these symptoms:  Fever of 100.4 F (38 C) or higher.  Pain or redness around your stitches if you had an incision.   Any yellow, white, or green fluid coming from places where you had stitches or surgery.    Mood Problems (postpartum depression)    Many people feel sad or have mood changes after having a baby. But for some people, these mood swings are worse.     Call your provider right away if you feel so anxious or nervous that you can't care for yourself or your baby.    Preeclampsia (high blood pressure)    Even if you didn't have high blood pressure when you were pregnant, you are at risk for the high blood pressure disease called preeclampsia. This risk can last up to 12 weeks after giving birth.     Call your  provider if you have:   Pain on your right side under your rib cage  Sudden swelling in the hands and face    Remember: You know your body. If something doesn't feel right, get medical help.     For informational purposes only. Not to replace the advice of your health care provider. Copyright 2020 [employer]. All rights reserved. Clinically reviewed by Lorna Yip, RNC-OB, MSN. [employer] 954991 - Rev 02/23.

## 2023-06-14 NOTE — ANESTHESIA POSTPROCEDURE EVALUATION
Patient: Yennifer Parisi    Procedure: * No procedures listed *       Anesthesia Type:  Epidural    Note:     Postop Pain Control: Uneventful            Sign Out: Well controlled pain   PONV: No   Neuro/Psych: Uneventful            Sign Out: Acceptable/Baseline neuro status   Airway/Respiratory: Uneventful            Sign Out: Acceptable/Baseline resp. status   CV/Hemodynamics: Uneventful            Sign Out: Acceptable CV status; No obvious hypovolemia; No obvious fluid overload   Other NRE:    DID A NON-ROUTINE EVENT OCCUR? No           Last vitals:  Vitals:    06/14/23 0123 06/14/23 0521 06/14/23 0933   BP: 118/80 111/68 116/69   Pulse: 81 74 76   Resp: 16 16 16   Temp: 36.6  C (97.8  F) 36.6  C (97.8  F) 36.6  C (97.9  F)   SpO2:          Electronically Signed By: Diana Saab MD, MD  June 14, 2023  4:04 PM

## 2023-06-14 NOTE — PLAN OF CARE
Vital signs stable. Postpartum assessment WDL. Pain controlled with tylenol and ibuprofen. Patient voiding without difficulty. Breastfeeding q2-3h. Patient and infant bonding well. Spouse at bedside. Will continue with current plan of care.

## 2023-06-14 NOTE — PROVIDER NOTIFICATION
Dr. Bravo notified of infants TSB result of 8.0  MD verbalized that infant is okay to discharge with a follow up on Friday.

## 2023-06-14 NOTE — PROGRESS NOTES
OB Post-partum Note  PPD# 1    S:  Patient doing well.  Pain controlled.  Voiding.  Bleeding light.  Breast feeding well per pt report.    O:  /68 (BP Location: Left arm, Patient Position: Supine, Cuff Size: Adult Regular)   Pulse 74   Temp 97.8  F (36.6  C) (Oral)   Resp 16   SpO2 100%   Breastfeeding Unknown   Gen- A&O, NAD  Abd- Non-tender, fundus firm at umbilicus  Ext- non-tender, no edema    Hemoglobin   Date Value Ref Range Status   2023 9.3 (L) 11.7 - 15.7 g/dL Final   10/27/2020 9.6 (L) 11.7 - 15.7 g/dL Final     O POS  Rubella Immune    A/P: 28 year old  PPD# 1 s/p     1.  Routine post-partum cares.  2.  Anticipate d/c home this afternoon.  3.  Follow up in clinic at 6 weeks postpartum, sooner PRN  4.  Pain meds and stool softener sent home with pt per pt request.    ZABRINA Engel CNM  2023  8:28 AM

## 2023-06-14 NOTE — PLAN OF CARE
Goal Outcome Evaluation:    Vitals signs stable. Fundus firm. Pain well controlled with Tylenol & Ibuprofen. Working on breastfeeding every 2-3 hours. Planning on discharging home today. Discharge instructions and follow up discussed. Questions and concerns addressed.

## 2023-06-14 NOTE — PLAN OF CARE
Goal Outcome Evaluation:         Vss, RA. LS clear. BS present. UO adequate.  IV SL.  Tylenol and ibuprofen controlling pain adequately.  Wants a 24 hr discharge.

## 2023-06-18 ENCOUNTER — HEALTH MAINTENANCE LETTER (OUTPATIENT)
Age: 28
End: 2023-06-18

## 2023-08-15 ENCOUNTER — LAB REQUISITION (OUTPATIENT)
Dept: LAB | Facility: CLINIC | Age: 28
End: 2023-08-15

## 2023-08-15 DIAGNOSIS — N39.0 URINARY TRACT INFECTION, SITE NOT SPECIFIED: ICD-10-CM

## 2023-08-15 PROCEDURE — 87186 SC STD MICRODIL/AGAR DIL: CPT | Performed by: OBSTETRICS & GYNECOLOGY

## 2023-08-17 LAB — BACTERIA UR CULT: ABNORMAL

## 2024-08-11 ENCOUNTER — HEALTH MAINTENANCE LETTER (OUTPATIENT)
Age: 29
End: 2024-08-11